# Patient Record
Sex: MALE | Race: WHITE | NOT HISPANIC OR LATINO | Employment: FULL TIME | URBAN - NONMETROPOLITAN AREA
[De-identification: names, ages, dates, MRNs, and addresses within clinical notes are randomized per-mention and may not be internally consistent; named-entity substitution may affect disease eponyms.]

---
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
## 2024-02-18 ENCOUNTER — HEALTH MAINTENANCE LETTER (OUTPATIENT)
Age: 19
End: 2024-02-18

## 2024-04-26 ENCOUNTER — ONCOLOGY VISIT (OUTPATIENT)
Dept: ONCOLOGY | Facility: OTHER | Age: 19
End: 2024-04-26
Attending: INTERNAL MEDICINE
Payer: COMMERCIAL

## 2024-04-26 VITALS
TEMPERATURE: 97.5 F | SYSTOLIC BLOOD PRESSURE: 133 MMHG | BODY MASS INDEX: 65.12 KG/M2 | HEART RATE: 90 BPM | RESPIRATION RATE: 16 BRPM | OXYGEN SATURATION: 97 % | DIASTOLIC BLOOD PRESSURE: 85 MMHG | WEIGHT: 315 LBS

## 2024-04-26 DIAGNOSIS — D68.51 FACTOR V LEIDEN (H): Primary | ICD-10-CM

## 2024-04-26 DIAGNOSIS — D68.59 PROTEIN C DEFICIENCY (H): ICD-10-CM

## 2024-04-26 PROCEDURE — 99205 OFFICE O/P NEW HI 60 MIN: CPT | Performed by: INTERNAL MEDICINE

## 2024-04-26 ASSESSMENT — PAIN SCALES - GENERAL: PAINLEVEL: NO PAIN (0)

## 2024-04-26 NOTE — NURSING NOTE
"Oncology Rooming Note    April 26, 2024 2:33 PM   John Yung is a 19 year old male who presents for:    Chief Complaint   Patient presents with    Hematology     Consult: Thrombophlebitis of Superficial vein of LLE and Hx of DVT of RLE, Factor V Leiden, Protein C Deficiency     Initial Vitals: BP (!) 166/80 (BP Location: Right arm, Patient Position: Sitting, Cuff Size: Adult Large)   Pulse 90   Temp 97.5  F (36.4  C) (Tympanic)   Resp 16   Wt (!) 200 kg (441 lb)   SpO2 97%   BMI 65.12 kg/m   Estimated body mass index is 65.12 kg/m  as calculated from the following:    Height as of 9/8/23: 1.753 m (5' 9\").    Weight as of this encounter: 200 kg (441 lb). Body surface area is 3.12 meters squared.  No Pain (0) Comment: Data Unavailable   No LMP for male patient.  Allergies reviewed: Yes  Medications reviewed: Yes    Medications: Medication refills not needed today.  Pharmacy name entered into [employer]: [id number] - [address]    Frailty Screening:   Is the patient here for a new oncology consult visit in cancer care? 1. Yes. Over the past month, have you experienced difficulty or required a caregiver to assist with:   1. Balance, walking or general mobility (including any falls)? NO  2. Completion of self-care tasks such as bathing, dressing, toileting, grooming/hygiene?  NO  3. Concentration or memory that affects your daily life?  NO       Clinical concerns: just wondering about his blood disorder     Asiya Newberry CMA (AAMA) 4/26/2024 2:33 PM  "

## 2024-05-01 NOTE — PROGRESS NOTES
HEMATOLOGY CONSULT NOTE  Apr 26, 2024    Reason for consult: History of DVT, protein C deficiency and heterozygous factor V Leiden.    HISTORY OF PRESENT ILLNESS  John Yung is a 19 year old male with PMH as stated below who is seen in the hematology clinic for history of VTE    His history in short is as follows:    John initially had a acute unprovoked DVT of the right popliteal vein in 7/2/2020.  At that time he does not remember any preceding illness or injury prior to this blood clot.  He was initially started on Lovenox 120 mg SQ twice daily and this was then increased to 130 mg twice daily.  He was then transitioned to Xarelto however Xarelto was stopped in October and November 2020 for him to participate in high school football.  He noted on 11/20/2020 that he developed right calf pain and swelling and a ultrasound done at that time showed occlusion of distal right femoral and popliteal vein.  Per notes he was on Xarelto till February 2021.  At that time he had calf pain.  However imaging done in care everywhere on 2/16/2021 and then 3/31/2021 showed persistent chronic DVT but did not show new DVT.  He then appears to be transition to Lovenox.  He remained on Lovenox through 2021.  He was seen by pediatric hematologist at [facility] as he wanted to continue to play football.  He was previously recommended not to file contact sports given persistent chronic DVT.  He met with Dr. Saavedra on 11/2021.  At this time he had stopped Lovenox on his own.  He was recommended to stay off Lovenox at the time as there was no acute thrombosis.    He then presented on 12/2023 with pain and redness in his left leg.    12/10/2023: Ultrasound left lower extremity:1.  No evidence left lower extremity deep venous thrombosis.  2.  Superficial thrombophlebitis of the left calf veins.    He has been on aspirin since then.  He currently denies any pain in his bilateral legs or redness.  He does notice swelling in both legs  at the end of the day.  Denies any chest pain or shortness of breath.    His protein C level was checked during [place] visit showed a protein C activity of 58 checked on 2/17/2021.  Other testing showed factor V Leiden that was checked on 10/27/2014 which showed heterozygous factor V Leiden.    He has a family history of factor V Leiden in his father and paternal grandmother.  Father has a history of blood clots.    REVIEW OF SYSTEMS  A 12-point ROS negative except as in HPI      Current Outpatient Medications   Medication Sig Dispense Refill    albuterol (PROAIR HFA/PROVENTIL HFA/VENTOLIN HFA) 108 (90 Base) MCG/ACT inhaler Inhale 2 puffs into the lungs every 6 hours as needed for shortness of breath, wheezing or cough 18 g 0    apixaban ANTICOAGULANT (ELIQUIS) 5 MG tablet Take 1 tablet (5 mg) by mouth 2 times daily 90 tablet 2    aspirin 81 MG EC tablet Take 1 tablet (81 mg) by mouth daily      cetirizine (ZYRTEC) 10 MG tablet Take 1 tablet (10 mg) by mouth every evening 90 tablet 3    EPIPEN 2-BRADLEY 0.3 MG/0.3ML injection INJECT 0.3ML INTO THE MUSCLE ONCE AS NEEDED 2 each 5    terbinafine (LAMISIL) 1 % external cream Apply topically 2 times daily (Patient not taking: Reported on 4/26/2024) 42 g 3       Allergies   Allergen Reactions    Amoxicillin GI Disturbance and Diarrhea     Per mom states he gets severe diarrhea from it.     No Clinical Screening - See Comments      Mold, pollen, cats    Penicillins Diarrhea     Immunization History   Administered Date(s) Administered    DTAP (<7y) 10/24/2005    DTAP-IPV, <7Y (QUADRACEL/KINRIX) 08/27/2009    DTaP/HepB/IPV 2004, 2004, 2004    Flu, Unspecified 10/15/2009, 09/13/2011    I5g1-10 Novel Flu 12/04/2009    HEPATITIS A (PEDS 12M-18Y) 08/12/2014, 09/04/2015    HIB (PRP-T) 2004, 2004, 04/21/2005    HIB(PRP-OMP)(PedvaxHIB) 2004    HPV9 08/22/2018, 08/09/2019    Hepatitis B, Peds 2004    Influenza (H1N1) 12/04/2009    Influenza (IIV3)  PF 2004, 2007, 2008, 10/02/2010    Influenza Vaccine >6 months,quad, PF 10/28/2014    Influenza, seasonal, injectable, PF 2007    MMR 10/24/2005, 2009    Meningococcal ACWY (Menactra ) 2015    Pneumococcal (PCV 7) 2004, 2004, 2004, 10/24/2005    Poliovirus, inactivated (IPV) 2004, 2004, 2004, 2009    TDAP Vaccine (Boostrix) 2014    Varicella 2005, 2009       Past Medical History:   Diagnosis Date    Acquired genu varum, unspecified laterality 3/22/2015    Activated protein C resistance (H24)     Heterozygous for factor V Leiden mutation.  No primary prophylaxis recommended.    DVT, lower extremity, recurrent, right (H) 2021    Factor V Leiden (H24) 2013    Overview:  Heterozygous for factor 5 deficiency.  Has seen Peds Heme/Onc at [facility] for workup. No h/o clot. Kate Maldonado MD ....................  2016   4:59 PM     Gastro-esophageal reflux disease without esophagitis     2015    Herpesviral infection     3/7/2011    Migraine without status migrainosus, not intractable     2012    Nasal congestion     Chronic nasal and sinus problems.    Osgood-Schlatter's disease of right lower extremity 10/22/2016    Osteochondroma of femur, left 3/22/2015    Other atopic dermatitis     10/24/2005    Pediatric body mass index (BMI) of greater than or equal to 95th percentile for age     4/15/2014    Severe obesity due to excess calories with serious comorbidity and body mass index (BMI) greater than 99th percentile for age in pediatric patient (H) 2020    Term birth of  male     Born 38 weeks induced vaginal delivery, birth wt 6 lbs 14 oz 19 in long.    Uncomplicated asthma     2010       Past Surgical History:   Procedure Laterality Date    ENDOSCOPIC SINUS SURGERY      2014,X 3    OTHER SURGICAL HISTORY      14,[id number],OSTEOTOMY,Left, Dr. Gomes, for genu varum deformity     OTHER SURGICAL HISTORY      12/23/15,[id number],HARDWARE REMOVAL,Left,distal femur I plate removal for genu varum deformity    OTHER SURGICAL HISTORY      [id number],HARDWARE REMOVAL,bilateral prox tibial hardware repositioning and placement    TONSILLECTOMY, ADENOIDECTOMY, COMBINED      05/08,with tubes    TYMPANOSTOMY, LOCAL/TOPICAL ANESTHESIA      03/28/06,PE tubes       SOCIAL HISTORY  History   Smoking Status    Never   Smokeless Tobacco    Never    Social History    Substance and Sexual Activity      Alcohol use: Never        Alcohol/week: 0.0 standard drinks of alcohol     History   Drug Use Unknown       FAMILY HISTORY  Family History   Problem Relation Age of Onset    Blood Disease Father         Blood Disease,Factor V Leiden /blood clots    Allergy (Severe) Father         Allergies,seasonal allergies    Asthma Mother         Asthma,mild    Diabetes Paternal Grandmother         Diabetes    Diabetes Paternal Grandfather         Diabetes    Other - See Comments Paternal Grandfather         bariatric surgery    Other - See Comments Maternal Uncle         hx migraines       PHYSICAL EXAMINATION  /85   Pulse 90   Temp 97.5  F (36.4  C) (Tympanic)   Resp 16   Wt (!) 200 kg (441 lb)   SpO2 97%   BMI 65.12 kg/m    Wt Readings from Last 2 Encounters:   04/26/24 (!) 200 kg (441 lb)   12/10/23 (!) 189.1 kg (417 lb) (>99%, Z= 4.01)*     * Growth percentiles are based on CDC (Boys, 2-20 Years) data.     Physical Exam  Constitutional:       Appearance: Normal appearance.   Pulmonary:      Effort: Pulmonary effort is normal.   Musculoskeletal:         General: No swelling or tenderness.   Neurological:      General: No focal deficit present.      Mental Status: He is alert and oriented to person, place, and time.   Psychiatric:         Mood and Affect: Mood normal.     Laboratory and Imaging:    Protein C Activity  70 - 150 % 58 Low        ASSESSMENT AND PLAN    History of Deep Vein thrombosis of Right lower extremity  and Superficial thrombosis of Left lower extremity  Protein C deficiency  Factor V Leiden    Initially diagnosed with right lower extremity DVT in 7/2020.  At that time treated with Lovenox.  He was transitioned to Xarelto and Xarelto was subsequently stopped in October November 2020 as he was playing high school football.  Found to have right femoral and popliteal vein DVT.  He was restarted on Xarelto.  Appears to be on Xarelto till 3/2021 when he was transitioned to Lovenox.  He remained on Lovenox through 2021 and stopped it at some point in 2021 as he was playing high school football.    He was then found to have superficial vein thrombosis in the left lower extremity..  He has a history of protein C deficiency ( level of 58 on 2/17/2021) and factor V Leiden heterozygous.    He is currently on aspirin.  On exam does not appear to have any suspicion of VTE.  However he is very high risk for thrombosis.  He has also gained weight since he stopped playing sports in high school and this puts him at risk for blood clots.    We discussed that given his recent diagnosis of superficial thrombosis it is possible that he will at some point present with a DVT.  I would at this point recommend that he start anticoagulation.  He has previously been on Xarelto and Lovenox.  He is currently not interested in taking Lovenox long-term and Lovenox can also come with the risk of osteoporosis.  Discussed recommendation to take Eliquis 5 mg twice daily.  No loading dose required as he does not have an acute VTE currently.  He is currently above 400 pounds.  The initial trials in Eliquis did not include increased weight however since then there have been studies that have included higher BMI.    He is aware he can stop aspirin when he starts Eliquis.     Will continue to follow yearly.     Total time spent on the patient on day of encounter was 60 minutes doing chart review, review of test results, interpretation of results, patient  visit and documentation.       Montserrat Lobo MD

## 2024-09-29 DIAGNOSIS — D68.51 FACTOR V LEIDEN (H): ICD-10-CM

## 2024-09-29 DIAGNOSIS — D68.59 PROTEIN C DEFICIENCY (H): ICD-10-CM

## 2024-09-30 RX ORDER — APIXABAN 5 MG/1
5 TABLET, FILM COATED ORAL 2 TIMES DAILY
Qty: 90 TABLET | Refills: 0 | Status: SHIPPED | OUTPATIENT
Start: 2024-09-30

## 2024-09-30 NOTE — TELEPHONE ENCOUNTER
Eliquis      Last Written Prescription Date:  8.12.24  Last Fill Quantity: #90,   # refills: 0  Last Office Visit: 4.26.24  Future Office visit:       Routing refill request to provider for review/approval because:

## 2024-11-11 DIAGNOSIS — D68.59 PROTEIN C DEFICIENCY (H): ICD-10-CM

## 2024-11-11 DIAGNOSIS — D68.51 FACTOR V LEIDEN (H): ICD-10-CM

## 2024-11-12 RX ORDER — APIXABAN 5 MG/1
5 TABLET, FILM COATED ORAL 2 TIMES DAILY
Qty: 90 TABLET | Refills: 0 | Status: SHIPPED | OUTPATIENT
Start: 2024-11-12

## 2024-11-12 NOTE — TELEPHONE ENCOUNTER
Last filled 9/30/24 #90- 0 refills.   Asiya Newberry CMA([facility])..................11/12/2024   11:56 AM

## 2024-12-23 ENCOUNTER — OFFICE VISIT (OUTPATIENT)
Dept: INTERNAL MEDICINE | Facility: OTHER | Age: 19
End: 2024-12-23
Attending: NURSE PRACTITIONER
Payer: COMMERCIAL

## 2024-12-23 VITALS
SYSTOLIC BLOOD PRESSURE: 132 MMHG | HEART RATE: 90 BPM | DIASTOLIC BLOOD PRESSURE: 78 MMHG | WEIGHT: 315 LBS | OXYGEN SATURATION: 96 % | BODY MASS INDEX: 65.27 KG/M2 | TEMPERATURE: 97 F | RESPIRATION RATE: 20 BRPM

## 2024-12-23 DIAGNOSIS — D68.59 PROTEIN C DEFICIENCY (H): ICD-10-CM

## 2024-12-23 DIAGNOSIS — D68.51 FACTOR V LEIDEN (H): ICD-10-CM

## 2024-12-23 DIAGNOSIS — L97.912 NONHEALING ULCER OF RIGHT LOWER EXTREMITY WITH FAT LAYER EXPOSED (H): Primary | ICD-10-CM

## 2024-12-23 DIAGNOSIS — I82.501 CHRONIC DEEP VEIN THROMBOSIS (DVT) OF RIGHT LOWER EXTREMITY, UNSPECIFIED VEIN (H): ICD-10-CM

## 2024-12-23 DIAGNOSIS — E66.01 MORBID OBESITY (H): ICD-10-CM

## 2024-12-23 ASSESSMENT — PAIN SCALES - GENERAL: PAINLEVEL_OUTOF10: NO PAIN (0)

## 2024-12-23 NOTE — PROGRESS NOTES
[facility] OUTPATIENT [facility] CONSULT    Assessment:     ICD-10-CM    1. Nonhealing ulcer of right lower extremity with fat layer exposed (H)  L97.912       2. Chronic deep vein thrombosis (DVT) of right lower extremity, unspecified vein (H)  I82.501       3. Morbid obesity (H)  E66.01       4. Factor V Leiden (H)  D68.51       5. Protein C deficiency (H)  D68.59           Plan:   Patient has multiple risk factors for nonhealing ulceration of the lower extremity including chronic edema, chronic DVT, obesity, factor V Leyden and protein C deficiencies in addition to anticoagulation therapy.  Dressing change every other day.  Cleanse with saline spray then apply Iodosorb gel to wound followed by small Medipore island dressing.  Wear thigh-high compression stockings.  Follow up in wound clinic in 2-3 weeks.  Prescription sent to [facility] and supplies sent with patient to last for the next week.  Monitor closely for any s/sx of wound worsening or evidence of infection as reviewed and discussed at visit.  Follow up urgently with any worsening or infection concerns.    Time spent today on history intake, record review, reviewing pertinent history, physical examination, wound evaluation and treatment, reviewing lab and diagnostic studies, ordering of supplies, counseling and care coordination: 33 minutes.     Subjective:  Here today for sore on the right calf that has been present since September.  He states this began as a pimple.  He thought maybe he had an infected hair.  He had a different one on the back of his calf and that 1 did close.  The one on the outside of the calf is still open.  He has been treating this with antibiotic ointment and a regular Band-Aid.  He states that he gets a little bit of yellow drainage.  It does not itch and is not painful.  He has history of factor V and protein C deficiency.  He is on Eliquis twice daily.  He has thigh-high compression garments but he has not been wearing those.   He works as a  at [employer].  He is on his feet most of the day when he is at work.  Has previous history of DVTs.  He is followed by hematologist.    Past Medical History:   Diagnosis Date    Acquired genu varum, unspecified laterality 3/22/2015    Activated protein C resistance (H)     Heterozygous for factor V Leiden mutation.  No primary prophylaxis recommended.    DVT, lower extremity, recurrent, right (H) 2021    Factor V Leiden (H) 2013    Overview:  Heterozygous for factor 5 deficiency.  Has [facility]eds Hem[facility]. No h/o clot. Kate Maldonado MD ....................  2016   4:59 PM     Gastro-esophageal reflux disease without esophagitis     2015    Herpesviral infection     3/7/2011    Migraine without status migrainosus, not intractable     2012    Nasal congestion     Chronic nasal and sinus problems.    Osgood-Schlatter's disease of right lower extremity 10/22/2016    Osteochondroma of femur, left 3/22/2015    Other atopic dermatitis     10/24/2005    Pediatric body mass index (BMI) of greater than or equal to 95th percentile for age     4/15/2014    Severe obesity due to excess calories with serious comorbidity and body mass index (BMI) greater than 99th percentile for age in pediatric patient (H) 2020    Term birth of  male     Born 38 weeks induced vaginal delivery, birth wt 6 lbs 14 oz 19 in long.    Uncomplicated asthma     2010     Past Surgical History:   Procedure Laterality Date    ENDOSCOPIC SINUS SURGERY      ,X 3    OTHER SURGICAL HISTORY      14,[id number],OSTEOTOMY,Left, Dr. Gomes, for genu varum deformity    OTHER SURGICAL HISTORY      12/23/15,[id number],HARDWARE REMOVAL,Left,distal femur I plate removal for genu varum deformity    OTHER SURGICAL HISTORY      [id number],HARDWARE REMOVAL,bilateral prox tibial hardware repositioning and placement    TONSILLECTOMY, ADENOIDECTOMY, COMBINED      ,with  tubes    TYMPANOSTOMY, LOCAL/TOPICAL ANESTHESIA      03/28/06,PE tubes     Amoxicillin, No clinical screening - see comments, and Penicillins  Current Outpatient Medications   Medication Sig Dispense Refill    albuterol (PROAIR HFA/PROVENTIL HFA/VENTOLIN HFA) 108 (90 Base) MCG/ACT inhaler Inhale 2 puffs into the lungs every 6 hours as needed for shortness of breath, wheezing or cough 18 g 0    cetirizine (ZYRTEC) 10 MG tablet Take 1 tablet (10 mg) by mouth every evening (Patient taking differently: Take 10 mg by mouth every evening. Taking as needed) 90 tablet 3    ELIQUIS ANTICOAGULANT 5 MG tablet TAKE 1 TABLET(5 MG) BY MOUTH TWICE DAILY 90 tablet 0    EPIPEN 2-BRADLEY 0.3 MG/0.3ML injection INJECT 0.3ML INTO THE MUSCLE ONCE AS NEEDED 2 each 5    terbinafine (LAMISIL) 1 % external cream Apply topically 2 times daily 42 g 3     No current facility-administered medications for this visit.       Review of Systems:  No fever, chills, odorous, purulent drainage, redness, warmth    Objective:   /78 (BP Location: Right arm, Patient Position: Sitting, Cuff Size: Adult Large)   Pulse 90   Temp 97  F (36.1  C) (Tympanic)   Resp 20   Wt (!) 200.5 kg (442 lb)   SpO2 96%   BMI 65.27 kg/m    Physical Exam     Pleasant 19-year-old gentleman no acute distress.  Accompanied by mother.  Alert and oriented x 4.    Wound Type:  Nonhealing ulcer  Location:  Right lateral calf  Wound Measurements:  0.2 cm x 0.2 cm x 0.2 cm  Wound Base:  Brown eschar covering wound bed  Undermining:  None  Tunneling:  None  Drainage:  Scant yellow  Periwound Tissue:  Thickened wound borders  Debridement:  Autolytic  Treatment:  Cleansed with MicroKlenz, Iodosorb gel applied followed by small Medipore island dressing      Comorbid Conditions Or Complications To Healing:  Morbid obesity, factor V and protein C deficiency, history of chronic DVT    Nutritional Status:  Good    Labs:  Last lab work completed May 2023    Diagnostics:  May 2023 venous  ultrasound right lower leg showed chronic DVT unchanged        Nishi Duncan, IRVINGP

## 2024-12-23 NOTE — NURSING NOTE
"Chief Complaint   Patient presents with    WOUND CARE     Right lower leg       Initial /78 (BP Location: Right arm, Patient Position: Sitting, Cuff Size: Adult Large)   Pulse 90   Temp 97  F (36.1  C) (Tympanic)   Resp 20   Wt (!) 200.5 kg (442 lb)   SpO2 96%   BMI 65.27 kg/m   Estimated body mass index is 65.27 kg/m  as calculated from the following:    Height as of 9/8/23: 1.753 m (5' 9\").    Weight as of this encounter: 200.5 kg (442 lb).  Medication Review: complete    The next two questions are to help us understand your food security.  If you are feeling you need any assistance in this area, we have resources available to support you today.           No data to display                Health Care Directive:  Patient does not have a Health Care Directive:     Tatyana Biswas RN      "

## 2024-12-30 ENCOUNTER — TELEPHONE (OUTPATIENT)
Dept: INTERNAL MEDICINE | Facility: OTHER | Age: 19
End: 2024-12-30

## 2024-12-30 ENCOUNTER — HOSPITAL ENCOUNTER (EMERGENCY)
Facility: OTHER | Age: 19
Discharge: HOME | End: 2024-12-31
Attending: FAMILY MEDICINE
Payer: COMMERCIAL

## 2024-12-30 ENCOUNTER — APPOINTMENT (OUTPATIENT)
Dept: GENERAL RADIOLOGY | Facility: OTHER | Age: 19
End: 2024-12-30
Attending: FAMILY MEDICINE
Payer: COMMERCIAL

## 2024-12-30 DIAGNOSIS — T14.8XXA OPEN WOUND OF SKIN: ICD-10-CM

## 2024-12-30 PROCEDURE — 99283 EMERGENCY DEPT VISIT LOW MDM: CPT | Performed by: FAMILY MEDICINE

## 2024-12-30 PROCEDURE — 73590 X-RAY EXAM OF LOWER LEG: CPT | Mod: RT

## 2024-12-30 ASSESSMENT — COLUMBIA-SUICIDE SEVERITY RATING SCALE - C-SSRS
6. HAVE YOU EVER DONE ANYTHING, STARTED TO DO ANYTHING, OR PREPARED TO DO ANYTHING TO END YOUR LIFE?: NO
1. IN THE PAST MONTH, HAVE YOU WISHED YOU WERE DEAD OR WISHED YOU COULD GO TO SLEEP AND NOT WAKE UP?: NO
2. HAVE YOU ACTUALLY HAD ANY THOUGHTS OF KILLING YOURSELF IN THE PAST MONTH?: NO

## 2024-12-30 NOTE — TELEPHONE ENCOUNTER
Patient was seen in clinic by Nishi EARL on 12/23. They are concerned there has been no improvement with the wound on his right lower extremity. Next appointment with Nishi EARL is on 1/14/24. Please advise.   Anyi Severino RN on 12/30/2024 at 10:53 AM

## 2024-12-30 NOTE — TELEPHONE ENCOUNTER
Patient's mother call concerning wound on leg not getting any better. Please call patient at [phone].    Fabi Randhawa on 12/30/2024 at 9:04 AM

## 2024-12-31 VITALS
HEIGHT: 68 IN | OXYGEN SATURATION: 94 % | TEMPERATURE: 98.4 F | DIASTOLIC BLOOD PRESSURE: 106 MMHG | SYSTOLIC BLOOD PRESSURE: 168 MMHG | BODY MASS INDEX: 47.74 KG/M2 | WEIGHT: 315 LBS | HEART RATE: 109 BPM | RESPIRATION RATE: 18 BRPM

## 2024-12-31 PROCEDURE — 250N000013 HC RX MED GY IP 250 OP 250 PS 637: Performed by: FAMILY MEDICINE

## 2024-12-31 RX ORDER — SULFAMETHOXAZOLE AND TRIMETHOPRIM 800; 160 MG/1; MG/1
1 TABLET ORAL 2 TIMES DAILY
Qty: 20 TABLET | Refills: 0 | Status: SHIPPED | OUTPATIENT
Start: 2024-12-31

## 2024-12-31 RX ORDER — SULFAMETHOXAZOLE AND TRIMETHOPRIM 800; 160 MG/1; MG/1
1 TABLET ORAL ONCE
Status: COMPLETED | OUTPATIENT
Start: 2024-12-31 | End: 2024-12-31

## 2024-12-31 RX ADMIN — SULFAMETHOXAZOLE AND TRIMETHOPRIM 1 TABLET: 800; 160 TABLET ORAL at 01:25

## 2024-12-31 ASSESSMENT — ENCOUNTER SYMPTOMS: WOUND: 1

## 2024-12-31 ASSESSMENT — ACTIVITIES OF DAILY LIVING (ADL): ADLS_ACUITY_SCORE: 41

## 2024-12-31 NOTE — DISCHARGE INSTRUCTIONS
John    I recommend you take Bactrim twice a day.    I did put in a referral for general surgery. Their staff should be calling you for a clinic appointment.    Thank you for choosing our Emergency Department for your care.     You may receive a phone call or letter for a survey about your care in our ED.  Please complete this as this is how we improve care for our patients.     If you have any questions after leaving the ED you can call or text me on my cell phone at [phone].  I am not on call so if I do not answer my phone please leave a message- I will get back to you.  If you are not doing well please return to the ED.     Sincerely,    Dr Valerio Park M.D.  Medical Director  [facility] Emergency Department

## 2024-12-31 NOTE — ED TRIAGE NOTES
"Pt presents to ED via private car with c/o worsening wound to his Rt outer calf that started in September and has been progressively getting worse. Pt was seen by Chari Duncan on 12/23. BP (!) 155/94   Pulse 92   Temp 98.4  F (36.9  C) (Tympanic)   Resp 18   Ht 1.727 m (5' 8\")   Wt (!) 199.6 kg (440 lb)   SpO2 97%   BMI 66.90 kg/m         Triage Assessment (Adult)       Row Name 12/30/24 2414          Triage Assessment    Airway WDL WDL        Respiratory WDL    Respiratory WDL WDL        Skin Circulation/Temperature WDL    Skin Circulation/Temperature WDL X  wound to RLE        Cardiac WDL    Cardiac WDL WDL        Peripheral/Neurovascular WDL    Peripheral Neurovascular WDL WDL        Cognitive/Neuro/Behavioral WDL    Cognitive/Neuro/Behavioral WDL WDL                     "

## 2024-12-31 NOTE — TELEPHONE ENCOUNTER
Patient returned call. He states he has been wearing his compression stocking. He has noticed a slight increase in drainage. Advised him to keep appointment for 1/14/24.   Anyi Severino RN on 12/31/2024 at 10:51 AM

## 2024-12-31 NOTE — ED PROVIDER NOTES
History     Chief Complaint   Patient presents with    Wound Check     The history is provided by the patient, a significant other and medical records.     John Yung is a 19 year old male who is here with a wound on the lateral right lower leg. This has been there since September when it was just a red spot on the skin, so a total of four months now. It became a pimple and now is an open wound. It does ooze.  He is on Eliquis.  It is not painful unless someone it pushing on it. He was seen in May 2023 for pain in the RLE. He had history of DVT in that leg and presented with swelling in the leg.  No sign of cellulitis at that visit and Doppler was negative for DVT. He was seen by Chari Duncan Dec 23 for non-healing wound on the lateral right lower leg.  At that visit he also said that the wound has been present since September.  At that time he thought it was an infected hair follicle (he has had those before) but it was not healing. He has been using an iodine ointment and Band Aid on it per instructions from Chari.    I did ask about possibility of foreign body in the wound (eg bird shot from hunting) but he denies any injury.     Allergies:  Allergies   Allergen Reactions    Amoxicillin GI Disturbance and Diarrhea     Per mom states he gets severe diarrhea from it.     No Clinical Screening - See Comments      Mold, pollen, cats    Penicillins Diarrhea       Problem List:    Patient Active Problem List    Diagnosis Date Noted    Thrombophlebitis of superficial veins of left lower extremity 12/10/2023     Priority: Medium    Chronic deep vein thrombosis (DVT) of right lower extremity, unspecified vein (H) 12/10/2023     Priority: Medium    Elevated blood pressure reading without diagnosis of hypertension 01/03/2022     Priority: Medium     will recheck at surgery follow up.       DVT, lower extremity, recurrent, right (H) 02/24/2021     Priority: Medium    Protein C deficiency (H) 08/06/2020     Priority:  Medium    Hypertriglyceridemia 07/08/2020     Priority: Medium    Morbid obesity (H) 07/08/2020     Priority: Medium    Osgood-Schlatter's disease of right lower extremity 10/22/2016     Priority: Medium    Gastroesophageal reflux disease without esophagitis 12/14/2015     Priority: Medium    Acquired genu varum, unspecified laterality 03/22/2015     Priority: Medium    Osteochondroma of femur, left 03/22/2015     Priority: Medium    Nasal polyps 06/23/2014     Priority: Medium    Body mass index (BMI) pediatric, 95th percentile for age to less than 120% of the 95th percentile for age 04/15/2014     Priority: Medium    Chronic recurrent sinusitis 04/04/2014     Priority: Medium    Hypertrophy of nasal turbinates 04/04/2014     Priority: Medium    Perennial allergic rhinitis 04/04/2014     Priority: Medium    Factor V Leiden (H) 08/30/2013     Priority: Medium     Overview:   Heterozygous for factor 5 deficiency.  Has seen Peds Heme/Onc at [facility] for workup. No h/o clot. aKte Maldonado MD ....................  12/5/2016   4:59 PM       Venous angioma of brain (H) 08/28/2013     Priority: Medium    Migraine headache 02/08/2012     Priority: Medium     Overview:   Seen by Dr. Kelley 8/28/2013.  Started on Topamax daily, Imitrex prn.  Recheck in 2-3 months.    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  No need for repeat imaging unless onset of headache out of proportion to previous headaches.  OK for sports.        Herpes simplex virus (HSV) infection 03/07/2011     Priority: Medium    Asthma 09/02/2010     Priority: Medium    Dermatitis, atopic 10/24/2005     Priority: Medium        Past Medical History:    Past Medical History:   Diagnosis Date    Acquired genu varum, unspecified laterality 3/22/2015    Activated protein C resistance (H)     DVT, lower extremity, recurrent, right (H) 2/24/2021    Factor V Leiden (H) 8/30/2013    Gastro-esophageal reflux disease without esophagitis     Herpesviral  infection     Migraine without status migrainosus, not intractable     Nasal congestion     Osgood-Schlatter's disease of right lower extremity 10/22/2016    Osteochondroma of femur, left 3/22/2015    Other atopic dermatitis     Pediatric body mass index (BMI) of greater than or equal to 95th percentile for age     Severe obesity due to excess calories with serious comorbidity and body mass index (BMI) greater than 99th percentile for age in pediatric patient (H) 2020    Term birth of  male     Uncomplicated asthma        Past Surgical History:    Past Surgical History:   Procedure Laterality Date    ENDOSCOPIC SINUS SURGERY      2014,X 3    OTHER SURGICAL HISTORY      14,[id number],OSTEOTOMY,Left, Dr. Gomes, for genu varum deformity    OTHER SURGICAL HISTORY      12/23/15,[id number],HARDWARE REMOVAL,Left,distal femur I plate removal for genu varum deformity    OTHER SURGICAL HISTORY      PSC808,HARDWARE REMOVAL,bilateral prox tibial hardware repositioning and placement    TONSILLECTOMY, ADENOIDECTOMY, COMBINED      ,with tubes    TYMPANOSTOMY, LOCAL/TOPICAL ANESTHESIA      06,PE tubes       Family History:    Family History   Problem Relation Age of Onset    Blood Disease Father         Blood Disease,Factor V Leiden /blood clots    Allergy (Severe) Father         Allergies,seasonal allergies    Asthma Mother         Asthma,mild    Diabetes Paternal Grandmother         Diabetes    Diabetes Paternal Grandfather         Diabetes    Other - See Comments Paternal Grandfather         bariatric surgery    Other - See Comments Maternal Uncle         hx migraines       Social History:  Marital Status:  Single [1]  Social History     Tobacco Use    Smoking status: Never     Passive exposure: Never    Smokeless tobacco: Never   Vaping Use    Vaping status: Never Used   Substance Use Topics    Alcohol use: Never     Alcohol/week: 0.0 standard drinks of alcohol    Drug use: Never        Medications:   "  ELIQUIS ANTICOAGULANT 5 MG tablet  sulfamethoxazole-trimethoprim (BACTRIM DS) 800-160 MG tablet  albuterol (PROAIR HFA/PROVENTIL HFA/VENTOLIN HFA) 108 (90 Base) MCG/ACT inhaler  cetirizine (ZYRTEC) 10 MG tablet  EPIPEN 2-BRADLEY 0.3 MG/0.3ML injection  terbinafine (LAMISIL) 1 % external cream          Review of Systems   Skin:  Positive for wound.   All other systems reviewed and are negative.      Physical Exam   BP: (!) 155/94  Pulse: 92  Temp: 98.4  F (36.9  C)  Resp: 18  Height: 172.7 cm (5' 8\")  Weight: (!) 199.6 kg (440 lb)  SpO2: 97 %      Physical Exam  Vitals and nursing note reviewed.   Skin:     Comments: He has a 3 mm open wound on the lateral right lower leg. It is oozing dark blood, no bright red blood. There is minimal redness surrounding the wound (see photo) and the skin around the edge of the wound looks normal on close exam by me. It looks like a punch biopsy of the skin.  There is some hint of rolled edges of the skin a short distance away from the wound.             Results for orders placed or performed during the hospital encounter of 12/30/24 (from the past 24 hours)   XR Tibia & Fibula Port Right 2 Views    Narrative    EXAM: XR TIBIA and FIBULA PORT RIGHT 2 VIEWS  LOCATION: [facility]  DATE: 12/30/2024    INDICATION: wound on the lateral right lower leg  COMPARISON: None.      Impression    IMPRESSION: Prior postoperative changes proximal aspect of the right tibia laterally. Normal variant hypertrophic changes distal shaft of the tibia laterally. No fracture, dislocation, or destructive changes of osteomyelitis. There is soft tissue edema   seen in the calf region with no associated gas.       Medications   sulfamethoxazole-trimethoprim (BACTRIM DS) 800-160 MG per tablet 1 tablet (has no administration in time range)       Assessments & Plan (with Medical Decision Making)  John Yung is a 19 year old male who is here with a wound on the lateral right lower leg. This " "has been there since September when it was just a red spot on the skin, so a total of four months now. It became a pimple and now is an open wound. It does ooze.  He is on Eliquis.  It is not painful unless someone it pushing on it. He was seen in May 2023 for pain in the RLE. He had history of DVT in that leg and presented with swelling in the leg.  No sign of cellulitis at that visit and Doppler was negative for DVT. He was seen by Chari Duncan Dec 23 for non-healing wound on the lateral right lower leg.  At that visit he also said that the wound has been present since September.  At that time he thought it was an infected hair follicle (he has had those before) but it was not healing. He has been using an iodine ointment and Band Aid on it per instructions from Chari.  I did ask about possibility of foreign body in the wound (eg bird shot from hunting) but he denies any injury.   VS in the ED BP (!) 155/94   Pulse 92   Temp 98.4  F (36.9  C) (Tympanic)   Resp 18   Ht 1.727 m (5' 8\")   Wt (!) 199.6 kg (440 lb)   SpO2 97%   BMI 66.90 kg/m    Exam shows an open skin wound- see photo.   Xray negative for FB and negative for gas in the tissue.   Ultrasound shows what appears to be small areas of air in the tissue directly under the wound. Given his lack of pain (unless I push directly over the wound) I do not think this is necrotizing fasciitis.   I am going to start Bactrim and recommend that he see general surgery.  I did give him a dose of Bactrim in the ED and sent him home with Bactrim from the [employer] machine.      I have reviewed the nursing notes.    I have reviewed the findings, diagnosis, plan and need for follow up with the patient.  Medical Decision Making  The patient's presentation was of moderate complexity (an undiagnosed new problem with uncertain prognosis).    The patient's evaluation involved:  an assessment requiring an independent historian (see separate area of note for " details)  review of external note(s) from 2 sources (see separate area of note for details)  review of 1 test result(s) ordered prior to this encounter (see separate area of note for details)  ordering and/or review of 1 test(s) in this encounter (see separate area of note for details)    The patient's management necessitated moderate risk (prescription drug management including medications given in the ED).        New Prescriptions    SULFAMETHOXAZOLE-TRIMETHOPRIM (BACTRIM DS) 800-160 MG TABLET    Take 1 tablet by mouth 2 times daily.       Final diagnoses:   Open wound of skin - lateral right lower leg       12/30/2024   [facility] AND [facility], Jose Flores MD  12/31/24 0122

## 2025-01-01 DIAGNOSIS — D68.51 FACTOR V LEIDEN (H): ICD-10-CM

## 2025-01-01 DIAGNOSIS — D68.59 PROTEIN C DEFICIENCY (H): ICD-10-CM

## 2025-01-02 RX ORDER — APIXABAN 5 MG/1
5 TABLET, FILM COATED ORAL 2 TIMES DAILY
Qty: 90 TABLET | Refills: 0 | Status: SHIPPED | OUTPATIENT
Start: 2025-01-02

## 2025-01-02 NOTE — TELEPHONE ENCOUNTER
Called and spoke with the Dasha after getting verbal consent to communicate. The patient was seen in the ED on 12/30/2 for concerns of his wound. The patient has is concerned that he is allergic to the Iodosorb as it stings when he puts it on causes irritation. Moved patients appointment up for 1/14 to 1/7. He is also being see by Mark Loerdo MD on 1/16/25.   Anyi Severino RN on 1/2/2025 at 2:27 PM

## 2025-01-02 NOTE — TELEPHONE ENCOUNTER
Make sure when he comes in that he wears compression stocking so that we are able to make sure it has adequate compression.

## 2025-01-07 ENCOUNTER — OFFICE VISIT (OUTPATIENT)
Dept: INTERNAL MEDICINE | Facility: OTHER | Age: 20
End: 2025-01-07
Attending: NURSE PRACTITIONER
Payer: COMMERCIAL

## 2025-01-07 VITALS
WEIGHT: 315 LBS | TEMPERATURE: 97 F | RESPIRATION RATE: 16 BRPM | HEART RATE: 71 BPM | DIASTOLIC BLOOD PRESSURE: 92 MMHG | OXYGEN SATURATION: 97 % | SYSTOLIC BLOOD PRESSURE: 156 MMHG | BODY MASS INDEX: 66.23 KG/M2

## 2025-01-07 DIAGNOSIS — L97.912 NONHEALING ULCER OF RIGHT LOWER EXTREMITY WITH FAT LAYER EXPOSED (H): Primary | ICD-10-CM

## 2025-01-07 DIAGNOSIS — I82.501 CHRONIC DEEP VEIN THROMBOSIS (DVT) OF RIGHT LOWER EXTREMITY, UNSPECIFIED VEIN (H): ICD-10-CM

## 2025-01-07 DIAGNOSIS — T14.8XXA HEMATOMA OF SKIN: ICD-10-CM

## 2025-01-07 ASSESSMENT — ASTHMA QUESTIONNAIRES
QUESTION_3 LAST FOUR WEEKS HOW OFTEN DID YOUR ASTHMA SYMPTOMS (WHEEZING, COUGHING, SHORTNESS OF BREATH, CHEST TIGHTNESS OR PAIN) WAKE YOU UP AT NIGHT OR EARLIER THAN USUAL IN THE MORNING: NOT AT ALL
QUESTION_1 LAST FOUR WEEKS HOW MUCH OF THE TIME DID YOUR ASTHMA KEEP YOU FROM GETTING AS MUCH DONE AT WORK, SCHOOL OR AT HOME: NONE OF THE TIME
QUESTION_2 LAST FOUR WEEKS HOW OFTEN HAVE YOU HAD SHORTNESS OF BREATH: NOT AT ALL
QUESTION_5 LAST FOUR WEEKS HOW WOULD YOU RATE YOUR ASTHMA CONTROL: COMPLETELY CONTROLLED
ACT_TOTALSCORE: 25
QUESTION_4 LAST FOUR WEEKS HOW OFTEN HAVE YOU USED YOUR RESCUE INHALER OR NEBULIZER MEDICATION (SUCH AS ALBUTEROL): NOT AT ALL
ACT_TOTALSCORE: 25

## 2025-01-07 ASSESSMENT — PAIN SCALES - GENERAL: PAINLEVEL_OUTOF10: NO PAIN (1)

## 2025-01-07 NOTE — NURSING NOTE
"Chief Complaint   Patient presents with    WOUND CARE       FOOD SECURITY SCREENING QUESTIONS  Hunger Vital Signs:  Within the past 12 months we worried whether our food would run out before we got money to buy more. Never  Within the past 12 months the food we bought just didn't last and we didn't have money to get more. Never  Anyi Severino RN 1/7/2025 12:42 PM      Initial BP (!) 156/92 (BP Location: Right arm, Patient Position: Sitting, Cuff Size: Adult Large)   Pulse 71   Temp 97  F (36.1  C) (Tympanic)   Resp 16   Wt (!) 197.6 kg (435 lb 9.6 oz)   SpO2 97%   BMI 66.23 kg/m   Estimated body mass index is 66.23 kg/m  as calculated from the following:    Height as of 12/30/24: 1.727 m (5' 8\").    Weight as of this encounter: 197.6 kg (435 lb 9.6 oz).  Medication Reconciliation: complete    Anyi Severino RN    "

## 2025-01-07 NOTE — PROGRESS NOTES
[facility] PROGRESS NOTE    Assessment:     ICD-10-CM    1. Nonhealing ulcer of right lower extremity with fat layer exposed (H)  L97.912       2. Hematoma of skin  T14.8XXA       3. Chronic deep vein thrombosis (DVT) of right lower extremity, unspecified vein (H)  I82.501           Plan:   Finish course of antibiotic.  Reviewed and discussed with patient that at this time with the evaluation it appears as though ulceration likely developed from a hematoma under the skin.  Would not be surprised if it did not become larger as the old blood is clearing out.  He needs to continue to use compression therapy with thigh-high compression stocking.  Continue with same dressing change as previously prescribed.  Follow-up in 2 weeks.    Monitor closely for any s/sx of wound worsening or evidence of infection as reviewed and discussed at visit.  Follow up urgently with any worsening or infection concerns.    Subjective:  Patient seen today to follow up on wound at right lower leg.    Last week he was seen in ED.  There was redness and irritation around the wound.  He was started on an antibiotic.  He states that the appearance around the wound is improving with the antibiotic and it is not as tender.  Denies fever and chills.  With this first began he states it started as a pimple several months ago.  He cannot recall if he tried expressing anything out of the pimple or if there was any underlying trauma that occurred.  He is on anticoagulants daily.    Past Medical History:   Diagnosis Date    Acquired genu varum, unspecified laterality 3/22/2015    Activated protein C resistance (H)     Heterozygous for factor V Leiden mutation.  No primary prophylaxis recommended.    DVT, lower extremity, recurrent, right (H) 2/24/2021    Factor V Leiden (H) 8/30/2013    Overview:  Heterozygous for factor 5 deficiency.  Has seen Peds Heme/Onc at [facility] for workup. No h/o clot. Kate Maldonado MD ....................  12/5/2016   4:59 PM      Gastro-esophageal reflux disease without esophagitis     2015    Herpesviral infection     3/7/2011    Migraine without status migrainosus, not intractable     2012    Nasal congestion     Chronic nasal and sinus problems.    Osgood-Schlatter's disease of right lower extremity 10/22/2016    Osteochondroma of femur, left 3/22/2015    Other atopic dermatitis     10/24/2005    Pediatric body mass index (BMI) of greater than or equal to 95th percentile for age     4/15/2014    Severe obesity due to excess calories with serious comorbidity and body mass index (BMI) greater than 99th percentile for age in pediatric patient (H) 2020    Term birth of  male     Born 38 weeks induced vaginal delivery, birth wt 6 lbs 14 oz 19 in long.    Uncomplicated asthma     2010     Past Surgical History:   Procedure Laterality Date    ENDOSCOPIC SINUS SURGERY      ,X 3    OTHER SURGICAL HISTORY      14,[id number],OSTEOTOMY,Left, Dr. Gomes, for genu varum deformity    OTHER SURGICAL HISTORY      12/23/15,[id number],HARDWARE REMOVAL,Left,distal femur I plate removal for genu varum deformity    OTHER SURGICAL HISTORY      [id number],HARDWARE REMOVAL,bilateral prox tibial hardware repositioning and placement    TONSILLECTOMY, ADENOIDECTOMY, COMBINED      ,with tubes    TYMPANOSTOMY, LOCAL/TOPICAL ANESTHESIA      06,PE tubes     Amoxicillin, No clinical screening - see comments, and Penicillins  Current Outpatient Medications   Medication Sig Dispense Refill    albuterol (PROAIR HFA/PROVENTIL HFA/VENTOLIN HFA) 108 (90 Base) MCG/ACT inhaler Inhale 2 puffs into the lungs every 6 hours as needed for shortness of breath, wheezing or cough 18 g 0    cetirizine (ZYRTEC) 10 MG tablet Take 1 tablet (10 mg) by mouth every evening (Patient taking differently: Take 10 mg by mouth every evening. Taking as needed) 90 tablet 3    ELIQUIS ANTICOAGULANT 5 MG tablet TAKE 1 TABLET(5 MG) BY MOUTH TWICE DAILY 90 tablet 0     EPIPEN 2-BRADLEY 0.3 MG/0.3ML injection INJECT 0.3ML INTO THE MUSCLE ONCE AS NEEDED 2 each 5    sulfamethoxazole-trimethoprim (BACTRIM DS) 800-160 MG tablet Take 1 tablet by mouth 2 times daily. 20 tablet 0    terbinafine (LAMISIL) 1 % external cream Apply topically 2 times daily 42 g 3     No current facility-administered medications for this visit.       Review of Systems:  See HPI    Objective:   BP (!) 156/92 (BP Location: Right arm, Patient Position: Sitting, Cuff Size: Adult Large)   Pulse 71   Temp 97  F (36.1  C) (Tympanic)   Resp 16   Wt (!) 197.6 kg (435 lb 9.6 oz)   SpO2 97%   BMI 66.23 kg/m    Physical Exam     Pleasant gentleman no acute distress    Wound Type:  Nonhealing ulcer complicated by venous stasis and hematoma  Location:  Right lateral lower leg  Wound Measurements:  0.7 cm x 1 cm x 0.3 cm  Wound Base:  Dark red-brown wound base.  Undermining:  None  Tunneling:  None  Drainage:  Scant red-brown  Periwound Tissue:  Light purple surrounding wound opening and firmness beneath which would be consistent with hematoma  Debridement:  Autolytic  Treatment:  Cleansed with Anasept, Iodosorb applied.  Calcium alginate packed into wound followed by Medipore island dressing    ED notes reviewed and discussed    DONNA Zhao

## 2025-01-22 ENCOUNTER — OFFICE VISIT (OUTPATIENT)
Dept: INTERNAL MEDICINE | Facility: OTHER | Age: 20
End: 2025-01-22
Attending: NURSE PRACTITIONER
Payer: COMMERCIAL

## 2025-01-22 VITALS
HEART RATE: 80 BPM | TEMPERATURE: 98.4 F | RESPIRATION RATE: 12 BRPM | BODY MASS INDEX: 47.74 KG/M2 | HEIGHT: 68 IN | SYSTOLIC BLOOD PRESSURE: 139 MMHG | DIASTOLIC BLOOD PRESSURE: 83 MMHG | WEIGHT: 315 LBS | OXYGEN SATURATION: 97 %

## 2025-01-22 DIAGNOSIS — T14.8XXA HEMATOMA OF SKIN: ICD-10-CM

## 2025-01-22 DIAGNOSIS — I82.501 CHRONIC DEEP VEIN THROMBOSIS (DVT) OF RIGHT LOWER EXTREMITY, UNSPECIFIED VEIN (H): ICD-10-CM

## 2025-01-22 DIAGNOSIS — E66.01 MORBID OBESITY (H): ICD-10-CM

## 2025-01-22 DIAGNOSIS — L97.912 NONHEALING ULCER OF RIGHT LOWER EXTREMITY WITH FAT LAYER EXPOSED (H): Primary | ICD-10-CM

## 2025-01-22 ASSESSMENT — PAIN SCALES - GENERAL: PAINLEVEL_OUTOF10: NO PAIN (0)

## 2025-01-22 NOTE — NURSING NOTE
"Chief Complaint   Patient presents with    WOUND CARE     Right calf       Initial /83 (BP Location: Right arm, Patient Position: Sitting, Cuff Size: Adult Regular)   Pulse 80   Temp 98.4  F (36.9  C) (Temporal)   Resp 12   Ht 1.727 m (5' 8\")   Wt (!) 197.6 kg (435 lb 9.6 oz)   SpO2 97%   BMI 66.23 kg/m   Estimated body mass index is 66.23 kg/m  as calculated from the following:    Height as of this encounter: 1.727 m (5' 8\").    Weight as of this encounter: 197.6 kg (435 lb 9.6 oz).  Medication Review: complete    The next two questions are to help us understand your food security.  If you are feeling you need any assistance in this area, we have resources available to support you today.           No data to display                  Health Care Directive:  Patient does not have a Health Care Directive: Discussed advance care planning with patient; however, patient declined at this time.    Farzana Mccracken      "

## 2025-01-22 NOTE — PROGRESS NOTES
[facility] PROGRESS NOTE    Assessment:     ICD-10-CM    1. Nonhealing ulcer of right lower extremity with fat layer exposed (H)  L97.912 UNNA BOOT APPLICATION      2. Hematoma of skin  T14.8XXA UNNA BOOT APPLICATION      3. Chronic deep vein thrombosis (DVT) of right lower extremity, unspecified vein (H)  I82.501       4. Morbid obesity (H)  E66.01           Plan:   Completed to right lower leg to facilitate healing.  This will also help with compression and likely will be more consistent then him wearing the thigh-high compression stocking.  May remove Unna boot if he has pain or problems.  Follow-up in clinic in 1 week.    Monitor closely for any s/sx of wound worsening or evidence of infection as reviewed and discussed at visit.  Follow up urgently with any worsening or infection concerns.    Subjective:  Patient seen today to follow up on wound at right lower leg.    He believes it is getting a little better.  He is just ready for it to heal.  He states he has been wearing a thigh-high compression stocking on the right leg.    Past Medical History:   Diagnosis Date    Acquired genu varum, unspecified laterality 3/22/2015    Activated protein C resistance     Heterozygous for factor V Leiden mutation.  No primary prophylaxis recommended.    DVT, lower extremity, recurrent, right (H) 2/24/2021    Factor V Leiden 8/30/2013    Overview:  Heterozygous for factor 5 deficiency.  Has seen Peds Heme/Onc at [facility] for workup. No h/o clot. Kate Maldonado MD ....................  12/5/2016   4:59 PM     Gastro-esophageal reflux disease without esophagitis     12/14/2015    Herpesviral infection     3/7/2011    Migraine without status migrainosus, not intractable     2/8/2012    Nasal congestion     Chronic nasal and sinus problems.    Osgood-Schlatter's disease of right lower extremity 10/22/2016    Osteochondroma of femur, left 3/22/2015    Other atopic dermatitis     10/24/2005    Pediatric body mass index (BMI) of  greater than or equal to 95th percentile for age     4/15/2014    Severe obesity due to excess calories with serious comorbidity and body mass index (BMI) greater than 99th percentile for age in pediatric patient (H) 2020    Term birth of  male     Born 38 weeks induced vaginal delivery, birth wt 6 lbs 14 oz 19 in long.    Uncomplicated asthma     2010     Past Surgical History:   Procedure Laterality Date    ENDOSCOPIC SINUS SURGERY      ,X 3    OTHER SURGICAL HISTORY      14,[id number],OSTEOTOMY,Left, Dr. Gomes, for genu varum deformity    OTHER SURGICAL HISTORY      12/23/15,[id number],HARDWARE REMOVAL,Left,distal femur I plate removal for genu varum deformity    OTHER SURGICAL HISTORY      [id number],HARDWARE REMOVAL,bilateral prox tibial hardware repositioning and placement    TONSILLECTOMY, ADENOIDECTOMY, COMBINED      ,with tubes    TYMPANOSTOMY, LOCAL/TOPICAL ANESTHESIA      06,PE tubes     Amoxicillin, No clinical screening - see comments, and Penicillins  Current Outpatient Medications   Medication Sig Dispense Refill    albuterol (PROAIR HFA/PROVENTIL HFA/VENTOLIN HFA) 108 (90 Base) MCG/ACT inhaler Inhale 2 puffs into the lungs every 6 hours as needed for shortness of breath, wheezing or cough 18 g 0    cetirizine (ZYRTEC) 10 MG tablet Take 1 tablet (10 mg) by mouth every evening (Patient taking differently: Take 10 mg by mouth every evening. Taking as needed) 90 tablet 3    ELIQUIS ANTICOAGULANT 5 MG tablet TAKE 1 TABLET(5 MG) BY MOUTH TWICE DAILY 90 tablet 0    EPIPEN 2-BRADLEY 0.3 MG/0.3ML injection INJECT 0.3ML INTO THE MUSCLE ONCE AS NEEDED 2 each 5    sulfamethoxazole-trimethoprim (BACTRIM DS) 800-160 MG tablet Take 1 tablet by mouth 2 times daily. 20 tablet 0    terbinafine (LAMISIL) 1 % external cream Apply topically 2 times daily 42 g 3     No current facility-administered medications for this visit.       Review of Systems:  See HPI    Objective:   /83 (BP Location:  "Right arm, Patient Position: Sitting, Cuff Size: Adult Regular)   Pulse 80   Temp 98.4  F (36.9  C) (Temporal)   Resp 12   Ht 1.727 m (5' 8\")   Wt (!) 197.6 kg (435 lb 9.6 oz)   SpO2 97%   BMI 66.23 kg/m    Physical Exam     Pleasant gentleman no acute distress    Wound Type:  Nonhealing ulcer complicated by venous stasis and hematoma  Location:  Right lateral lower leg  Wound Measurements:  0.7 cm x 1.1 cm x 0. 4 cm  Wound Base:  Light pink-tan wound bed.  Undermining:  None  Tunneling:  None  Drainage:  Scant red-brown  Periwound Tissue:  No surrounding erythema, warmth or maceration   Debridement:  Autolytic  Treatment:  Cleansed with Anasept, Iodosorb applied.  Calcium alginate packed into wound followed by 4 inch OPTi foam.  Unna boot x 1, Kerlix x 1 and Coban x 1 applied to right lower extremity        DONNA Zhao   "

## 2025-01-29 ENCOUNTER — OFFICE VISIT (OUTPATIENT)
Dept: INTERNAL MEDICINE | Facility: OTHER | Age: 20
End: 2025-01-29
Attending: NURSE PRACTITIONER
Payer: COMMERCIAL

## 2025-01-29 VITALS
BODY MASS INDEX: 66.9 KG/M2 | SYSTOLIC BLOOD PRESSURE: 148 MMHG | WEIGHT: 315 LBS | HEART RATE: 97 BPM | RESPIRATION RATE: 17 BRPM | DIASTOLIC BLOOD PRESSURE: 80 MMHG | OXYGEN SATURATION: 97 % | TEMPERATURE: 97 F

## 2025-01-29 DIAGNOSIS — I82.501 CHRONIC DEEP VEIN THROMBOSIS (DVT) OF RIGHT LOWER EXTREMITY, UNSPECIFIED VEIN (H): ICD-10-CM

## 2025-01-29 DIAGNOSIS — T14.8XXA HEMATOMA OF SKIN: ICD-10-CM

## 2025-01-29 DIAGNOSIS — L97.912 NONHEALING ULCER OF RIGHT LOWER EXTREMITY WITH FAT LAYER EXPOSED (H): Primary | ICD-10-CM

## 2025-01-29 DIAGNOSIS — R60.1 GENERALIZED EDEMA: ICD-10-CM

## 2025-01-29 ASSESSMENT — PAIN SCALES - GENERAL: PAINLEVEL_OUTOF10: NO PAIN (0)

## 2025-01-29 NOTE — PROGRESS NOTES
[facility] PROGRESS NOTE    Assessment:     ICD-10-CM    1. Nonhealing ulcer of right lower extremity with fat layer exposed (H)  L97.912 Compression Sleeve/Stocking Order for DME - ONLY FOR DME      2. Hematoma of skin  T14.8XXA Compression Sleeve/Stocking Order for DME - ONLY FOR DME      3. Chronic deep vein thrombosis (DVT) of right lower extremity, unspecified vein (H)  I82.501 Compression Sleeve/Stocking Order for DME - ONLY FOR DME      4. Generalized edema  R60.1 Compression Sleeve/Stocking Order for DME - ONLY FOR DME          Plan:   Dressing change daily.  Discontinue Iodosorb.  Start Medihoney to wound bed, pack with AlgiSite followed by Medipore island dressing.  Discussed with patient that the only way for this ulceration to heal is for him to be compliant with compression.  Measured and order placed to [facility] for CircAid compression garments.  Once these arrive he will be instructed on proper donning with recommendations for 40-50 mmHg compression.  Monitor closely for any s/sx of wound worsening or evidence of infection as reviewed and discussed at visit.  Follow up urgently with any worsening or infection concerns.    Subjective:  Patient seen today to follow up on wound at right lower leg.    Had Unna boot placed last week.  He states that it hurt and he removed it after a couple of days.  He does not want an Unna boot.  He has not been wearing knee-high compression stockings.  Has not been elevating.  States that the wound has been stinging anything because it is because of the Iodosorb gel.  Has not been able to do foam dressing as he needs to shower every day and would not be covered by insurance to change dressing more frequently than every other day.    Past Medical History:   Diagnosis Date    Acquired genu varum, unspecified laterality 3/22/2015    Activated protein C resistance     Heterozygous for factor V Leiden mutation.  No primary prophylaxis recommended.    DVT, lower  extremity, recurrent, right (H) 2021    Factor V Leiden 2013    Overview:  Heterozygous for factor 5 deficiency.  Has seen Peds Heme/Onc at [facility] for workup. No h/o clot. Kate Maldonado MD ....................  2016   4:59 PM     Gastro-esophageal reflux disease without esophagitis     2015    Herpesviral infection     3/7/2011    Migraine without status migrainosus, not intractable     2012    Nasal congestion     Chronic nasal and sinus problems.    Osgood-Schlatter's disease of right lower extremity 10/22/2016    Osteochondroma of femur, left 3/22/2015    Other atopic dermatitis     10/24/2005    Pediatric body mass index (BMI) of greater than or equal to 95th percentile for age     4/15/2014    Severe obesity due to excess calories with serious comorbidity and body mass index (BMI) greater than 99th percentile for age in pediatric patient (H) 2020    Term birth of  male     Born 38 weeks induced vaginal delivery, birth wt 6 lbs 14 oz 19 in long.    Uncomplicated asthma     2010     Past Surgical History:   Procedure Laterality Date    ENDOSCOPIC SINUS SURGERY      2014,X 3    OTHER SURGICAL HISTORY      14,PARDQ545,OSTEOTOMY,Left, Dr. Gomes, for genu varum deformity    OTHER SURGICAL HISTORY      12/23/15,EOU220,HARDWARE REMOVAL,Left,distal femur I plate removal for genu varum deformity    OTHER SURGICAL HISTORY      KYR718,HARDWARE REMOVAL,bilateral prox tibial hardware repositioning and placement    TONSILLECTOMY, ADENOIDECTOMY, COMBINED      ,with tubes    TYMPANOSTOMY, LOCAL/TOPICAL ANESTHESIA      06,PE tubes     Amoxicillin, No clinical screening - see comments, and Penicillins  Current Outpatient Medications   Medication Sig Dispense Refill    albuterol (PROAIR HFA/PROVENTIL HFA/VENTOLIN HFA) 108 (90 Base) MCG/ACT inhaler Inhale 2 puffs into the lungs every 6 hours as needed for shortness of breath, wheezing or cough 18 g 0    cetirizine  (ZYRTEC) 10 MG tablet Take 1 tablet (10 mg) by mouth every evening (Patient taking differently: Take 10 mg by mouth every evening. Taking as needed) 90 tablet 3    ELIQUIS ANTICOAGULANT 5 MG tablet TAKE 1 TABLET(5 MG) BY MOUTH TWICE DAILY 90 tablet 0    EPIPEN 2-BRADLEY 0.3 MG/0.3ML injection INJECT 0.3ML INTO THE MUSCLE ONCE AS NEEDED 2 each 5    sulfamethoxazole-trimethoprim (BACTRIM DS) 800-160 MG tablet Take 1 tablet by mouth 2 times daily. 20 tablet 0    terbinafine (LAMISIL) 1 % external cream Apply topically 2 times daily 42 g 3     No current facility-administered medications for this visit.       Review of Systems:  See HPI    Objective:   BP (!) 148/80 (BP Location: Right arm, Patient Position: Sitting, Cuff Size: Adult Large)   Pulse 97   Temp 97  F (36.1  C) (Tympanic)   Resp 17   Wt (!) 199.6 kg (440 lb)   SpO2 97%   BMI 66.90 kg/m    Physical Exam     Pleasant gentleman no acute distress    Wound Type:  Nonhealing ulcer complicated by venous stasis  Location:  Right lateral lower leg  Wound Measurements:  0.7 cm x 1.2 cm x 0.5 cm  Wound Base:  Light pink-tan wound bed  Undermining:  None  Tunneling:  None  Drainage:  Small brown red, non-odorous  Periwound Tissue:  No surrounding erythema, warmth or maceration   Debridement:  Autolytic  Treatment:  Cleansed with Anasept, Medihoney applied,Calcium alginate packed into wound followed by medipore island dressing.  Unna boot x 1, Kerlix x 1 and Coban x 1 applied to right lower extremity        DONNA Zhao

## 2025-01-29 NOTE — NURSING NOTE
"Chief Complaint   Patient presents with    WOUND CARE       FOOD SECURITY SCREENING QUESTIONS  Hunger Vital Signs:  Within the past 12 months we worried whether our food would run out before we got money to buy more. Never  Within the past 12 months the food we bought just didn't last and we didn't have money to get more. Never  Anyi Severino RN 1/29/2025 1:39 PM      Initial BP (!) 148/80 (BP Location: Right arm, Patient Position: Sitting, Cuff Size: Adult Large)   Pulse 97   Temp 97  F (36.1  C) (Tympanic)   Resp 17   Wt (!) 199.6 kg (440 lb)   SpO2 97%   BMI 66.90 kg/m   Estimated body mass index is 66.9 kg/m  as calculated from the following:    Height as of 1/22/25: 1.727 m (5' 8\").    Weight as of this encounter: 199.6 kg (440 lb).  Medication Reconciliation: complete    Anyi Severino RN    "

## 2025-02-24 ENCOUNTER — OFFICE VISIT (OUTPATIENT)
Dept: INTERNAL MEDICINE | Facility: OTHER | Age: 20
End: 2025-02-24
Attending: NURSE PRACTITIONER
Payer: COMMERCIAL

## 2025-02-24 VITALS
TEMPERATURE: 98.6 F | HEART RATE: 75 BPM | OXYGEN SATURATION: 97 % | BODY MASS INDEX: 66.66 KG/M2 | SYSTOLIC BLOOD PRESSURE: 182 MMHG | WEIGHT: 315 LBS | DIASTOLIC BLOOD PRESSURE: 97 MMHG | RESPIRATION RATE: 16 BRPM

## 2025-02-24 DIAGNOSIS — R03.0 ELEVATED BLOOD PRESSURE READING WITHOUT DIAGNOSIS OF HYPERTENSION: ICD-10-CM

## 2025-02-24 DIAGNOSIS — T14.8XXA HEMATOMA OF SKIN: ICD-10-CM

## 2025-02-24 DIAGNOSIS — F41.9 ANXIETY: ICD-10-CM

## 2025-02-24 DIAGNOSIS — I10 WHITE COAT SYNDROME WITH DIAGNOSIS OF HYPERTENSION: ICD-10-CM

## 2025-02-24 DIAGNOSIS — R60.1 GENERALIZED EDEMA: ICD-10-CM

## 2025-02-24 DIAGNOSIS — D68.51 FACTOR V LEIDEN: ICD-10-CM

## 2025-02-24 DIAGNOSIS — D68.59 PROTEIN C DEFICIENCY: ICD-10-CM

## 2025-02-24 DIAGNOSIS — I82.501 CHRONIC DEEP VEIN THROMBOSIS (DVT) OF RIGHT LOWER EXTREMITY, UNSPECIFIED VEIN (H): ICD-10-CM

## 2025-02-24 DIAGNOSIS — L97.912 NONHEALING ULCER OF RIGHT LOWER EXTREMITY WITH FAT LAYER EXPOSED (H): Primary | ICD-10-CM

## 2025-02-24 ASSESSMENT — PAIN SCALES - GENERAL: PAINLEVEL_OUTOF10: NO PAIN (0)

## 2025-02-24 NOTE — PROGRESS NOTES
[facility] PROGRESS NOTE    Assessment:     ICD-10-CM    1. Nonhealing ulcer of right lower extremity with fat layer exposed (H)  L97.912       2. Hematoma of skin  T14.8XXA       3. Chronic deep vein thrombosis (DVT) of right lower extremity, unspecified vein (H)  I82.501       4. Generalized edema  R60.1       5. Elevated blood pressure reading without diagnosis of hypertension  R03.0       6. White coat syndrome with diagnosis of hypertension  I10       7. Anxiety  F41.9           Plan:   -Dressing change daily.  Apply Medihoney to wound bed, pack with AlgiSite followed by Medipore island dressing.  Continue with CircAid compression garments.    Follow-up in wound clinic in 2-3 weeks, sooner with concerns.  Monitor closely for any s/sx of wound worsening or evidence of infection as reviewed and discussed at visit.  Follow up urgently with any worsening or infection concerns.  -Reviewed and discussed patient's blood pressure reading with he and his significant other.  Explained that I was very concerned about his blood pressure being this high.  He assured me his blood pressure normally is not this high and that he just becomes stressed and anxious when he comes to the clinic.  Significant other who does have blood pressure cuff at home.  She will check his blood pressure at least daily and record.  If blood pressure reading continues to be this high then would recommend they follow-up in clinic within the next week.  Goal blood pressure of 135/85 recommended.  If higher then needs to be treated for hypertension.  Reviewed and discussed the complications which can occur with untreated hypertension.  He expresses understanding.    Subjective:  Patient seen today to follow up on wound at right lower leg.    Wearing CircAid compression garment on right lower leg.  He states this is very comfortable.  His significant other continues to do his dressing change and she believes wound is healing.  Blood pressure elevated in  clinic.  He states he gets very anxious when he comes to the clinic.  Other previous blood pressures intermittently elevated but some within normal range.    Past Medical History:   Diagnosis Date    Acquired genu varum, unspecified laterality 3/22/2015    Activated protein C resistance     Heterozygous for factor V Leiden mutation.  No primary prophylaxis recommended.    DVT, lower extremity, recurrent, right (H) 2021    Factor V Leiden 2013    Overview:  Heterozygous for factor 5 deficiency.  Has seen Peds Heme/Onc at [facility] for workup. No h/o clot. Kate Maldonado MD ....................  2016   4:59 PM     Gastro-esophageal reflux disease without esophagitis     2015    Herpesviral infection     3/7/2011    Migraine without status migrainosus, not intractable     2012    Nasal congestion     Chronic nasal and sinus problems.    Osgood-Schlatter's disease of right lower extremity 10/22/2016    Osteochondroma of femur, left 3/22/2015    Other atopic dermatitis     10/24/2005    Pediatric body mass index (BMI) of greater than or equal to 95th percentile for age     4/15/2014    Severe obesity due to excess calories with serious comorbidity and body mass index (BMI) greater than 99th percentile for age in pediatric patient (H) 2020    Term birth of  male     Born 38 weeks induced vaginal delivery, birth wt 6 lbs 14 oz 19 in long.    Uncomplicated asthma     2010     Past Surgical History:   Procedure Laterality Date    ENDOSCOPIC SINUS SURGERY      ,X 3    OTHER SURGICAL HISTORY      14,[id number],OSTEOTOMY,Left, Dr. Gomes, for genu varum deformity    OTHER SURGICAL HISTORY      12/23/15,[id number],HARDWARE REMOVAL,Left,distal femur I plate removal for genu varum deformity    OTHER SURGICAL HISTORY      [id number],HARDWARE REMOVAL,bilateral prox tibial hardware repositioning and placement    TONSILLECTOMY, ADENOIDECTOMY, COMBINED      ,with tubes    TYMPANOSTOMY,  LOCAL/TOPICAL ANESTHESIA      03/28/06,PE tubes     Amoxicillin, No clinical screening - see comments, and Penicillins  Current Outpatient Medications   Medication Sig Dispense Refill    albuterol (PROAIR HFA/PROVENTIL HFA/VENTOLIN HFA) 108 (90 Base) MCG/ACT inhaler Inhale 2 puffs into the lungs every 6 hours as needed for shortness of breath, wheezing or cough 18 g 0    cetirizine (ZYRTEC) 10 MG tablet Take 1 tablet (10 mg) by mouth every evening (Patient taking differently: Take 10 mg by mouth every evening. Taking as needed) 90 tablet 3    ELIQUIS ANTICOAGULANT 5 MG tablet TAKE 1 TABLET(5 MG) BY MOUTH TWICE DAILY 90 tablet 0    EPIPEN 2-BRADLEY 0.3 MG/0.3ML injection INJECT 0.3ML INTO THE MUSCLE ONCE AS NEEDED 2 each 5    sulfamethoxazole-trimethoprim (BACTRIM DS) 800-160 MG tablet Take 1 tablet by mouth 2 times daily. 20 tablet 0    terbinafine (LAMISIL) 1 % external cream Apply topically 2 times daily 42 g 3     No current facility-administered medications for this visit.       Review of Systems:  See HPI    Objective:   BP (!) 182/97 (BP Location: Right arm, Patient Position: Sitting, Cuff Size: Adult Regular)   Pulse 75   Temp 98.6  F (37  C) (Temporal)   Resp 16   Wt (!) 198.9 kg (438 lb 6.4 oz)   SpO2 97%   BMI 66.66 kg/m    Physical Exam     Pleasant gentleman no acute distress  Lung fields clear to auscultation.  Cardiovascular regular rate and rhythm.    Wound Type:  Nonhealing ulcer complicated by venous stasis  Location:  Right lateral lower leg  Wound Measurements:  0. 4 cm x 0.7 cm x 0. 3 cm  Wound Base:  100% light pink granulation tissue  Undermining:  None  Tunneling:  None  Drainage:  Small amount of serosanguineous drainage  Periwound Tissue:  No surrounding erythema, warmth or maceration   Debridement:  Autolytic  Treatment:  Cleansed with Anasept, Medihoney applied,Calcium alginate packed into wound followed by medipore island dressing.       Nishi Duncan, DONNA

## 2025-02-24 NOTE — NURSING NOTE
"Chief Complaint   Patient presents with    WOUND CARE       Initial BP (!) 172/91 (BP Location: Right arm, Patient Position: Sitting, Cuff Size: Adult Regular)   Pulse 75   Temp 98.6  F (37  C) (Temporal)   Resp 16   Wt (!) 198.9 kg (438 lb 6.4 oz)   SpO2 97%   BMI 66.66 kg/m   Estimated body mass index is 66.66 kg/m  as calculated from the following:    Height as of 1/22/25: 1.727 m (5' 8\").    Weight as of this encounter: 198.9 kg (438 lb 6.4 oz).  Medication Review: complete    The next two questions are to help us understand your food security.  If you are feeling you need any assistance in this area, we have resources available to support you today.           No data to display                  Health Care Directive:  Patient does not have a Health Care Directive: Discussed advance care planning with patient; however, patient declined at this time.    Diann Branham      "

## 2025-02-26 RX ORDER — APIXABAN 5 MG/1
5 TABLET, FILM COATED ORAL 2 TIMES DAILY
Qty: 90 TABLET | Refills: 0 | Status: SHIPPED | OUTPATIENT
Start: 2025-02-26

## 2025-02-27 NOTE — TELEPHONE ENCOUNTER
Will take several weeks to heal.  Is he wearing the compression hose and doing prescribed dressing change   Never

## 2025-03-09 ENCOUNTER — HEALTH MAINTENANCE LETTER (OUTPATIENT)
Age: 20
End: 2025-03-09

## 2025-04-23 ENCOUNTER — TELEPHONE (OUTPATIENT)
Dept: INTERNAL MEDICINE | Facility: OTHER | Age: 20
End: 2025-04-23
Payer: COMMERCIAL

## 2025-04-23 DIAGNOSIS — L97.912 NONHEALING ULCER OF RIGHT LOWER EXTREMITY WITH FAT LAYER EXPOSED (H): Primary | ICD-10-CM

## 2025-04-23 RX ORDER — ADHESIVE BANDAGE 2"X2.75"
BANDAGE TOPICAL
Qty: 1 EACH | Refills: 3 | OUTPATIENT
Start: 2025-04-23

## 2025-04-23 RX ORDER — CALCIUM ALGINATE 100 %
POWDER (GRAM) MISCELLANEOUS
Qty: 1 G | Refills: 3 | OUTPATIENT
Start: 2025-04-23

## 2025-04-23 NOTE — TELEPHONE ENCOUNTER
Insurance requires that patient is seen every month in order to refill wound care dressings.  Patient can be seen as a work in appointment on May 2.  In the meantime I have put some wound care supplies at the unit 3 window for patient to  which should last until appointment

## 2025-04-23 NOTE — TELEPHONE ENCOUNTER
After proper verification, patients Mother was notified that Provider would put wound care supplies up front of Unit 3 for patient to . They were also given a date and time for an appointment that Sowmya was able to work him in on 05/02/25 at 9:45 am. He was put into schedule.  Nydia Vela LPN on 4/23/2025 at 3:27 PM  EXT. [id number]

## 2025-04-23 NOTE — TELEPHONE ENCOUNTER
Reason for call: wound care supply refill    How many days of supplies do you have left? About a week    What pharmacy do you use? [facility] method for responding to this message: Telephone Call    Phone number patient can be reached at: Other phone number:  [phone]    If we cannot reach you directly, may we leave a detailed response at the number you provided? Yes     Please call John's wife at [phone] as John is working.  They need wound dressing, honey ointment, wound  and gauze pads.    Nina Singh on 4/23/2025 at 2:29 PM

## 2025-05-08 ENCOUNTER — OFFICE VISIT (OUTPATIENT)
Dept: FAMILY MEDICINE | Facility: OTHER | Age: 20
End: 2025-05-08
Payer: COMMERCIAL

## 2025-05-08 VITALS
HEIGHT: 68 IN | RESPIRATION RATE: 20 BRPM | OXYGEN SATURATION: 97 % | BODY MASS INDEX: 47.74 KG/M2 | DIASTOLIC BLOOD PRESSURE: 91 MMHG | TEMPERATURE: 97.5 F | SYSTOLIC BLOOD PRESSURE: 159 MMHG | WEIGHT: 315 LBS | HEART RATE: 102 BPM

## 2025-05-08 DIAGNOSIS — H66.91 ACUTE OTITIS MEDIA, RIGHT: Primary | ICD-10-CM

## 2025-05-08 PROCEDURE — 3077F SYST BP >= 140 MM HG: CPT

## 2025-05-08 PROCEDURE — 99213 OFFICE O/P EST LOW 20 MIN: CPT

## 2025-05-08 PROCEDURE — 1125F AMNT PAIN NOTED PAIN PRSNT: CPT

## 2025-05-08 PROCEDURE — 3080F DIAST BP >= 90 MM HG: CPT

## 2025-05-08 RX ORDER — CEFDINIR 300 MG/1
300 CAPSULE ORAL 2 TIMES DAILY
Qty: 14 CAPSULE | Refills: 0 | Status: SHIPPED | OUTPATIENT
Start: 2025-05-08 | End: 2025-05-15

## 2025-05-08 ASSESSMENT — PAIN SCALES - GENERAL: PAINLEVEL_OUTOF10: MODERATE PAIN (4)

## 2025-05-08 NOTE — NURSING NOTE
Pt here for right ear pain since Tuesday.  Got really bad last night.  Melina Naylor CMA ([facility])......................5/8/2025  2:06 PM     Medication Reconciliation: complete    Melina Naylor CMA  5/8/2025 2:06 PM      FOOD SECURITY SCREENING QUESTIONS:    The next two questions are to help us understand your food security.  If you are feeling you need any assistance in this area, we have resources available to support you today.    Hunger Vital Signs:  Within the past 12 months we worried whether our food would run out before we got money to buy more. Never  Within the past 12 months the food we bought just didn't last and we didn't have money to get more. Never  Melina Naylor CMA,LPN on 5/8/2025 at 2:06 PM

## 2025-05-08 NOTE — PROGRESS NOTES
John Yung  2005    ASSESSMENT/PLAN      1. Acute otitis media, right (Primary)  - cefdinir (OMNICEF) 300 MG capsule; Take 1 capsule (300 mg) by mouth 2 times daily for 7 days.  Dispense: 14 capsule; Refill: 0    - Cefdinir twice daily for 7 days provided for acute otitis media of the right ear  - Discussed using Flonase and Claritin daily.  - May use over-the-counter Tylenol or ibuprofen PRN  - Follow up as needed for new or worsening symptoms          *Explanation of diagnosis, treatment options and risk and benefits of medications reviewed with patient. Patient agrees with plan of care.  *All questions were answered.    *Red flags symptoms were discussed and patient was advised when they should return for reevaluation or for prompt emergency evaluation.   *Patient was given verbal and written instructions on plan of care. Instructions were printed or are available on [facility] on electronic AVS.   *We discussed potential side effects of any prescribed or recommended therapies, as well as expectations for response to treatments.  *Patient discharged in stable condition    Sohail Park, BERKLEY, APRN, FNP-C  [facility]    SUBJECTIVE  CHIEF COMPLAINT/ REASON FOR VISIT  Patient presents with:  Ear Problem: Right ear pain     HISTORY OF PRESENT ILLNESS  John Yung is a pleasant 20 year old male presents to [facility] today with right ear pain.  Patient states last few days he has had right-sided ear pain with pressure.  He does endorse reduced hearing.  He denies any fever, cough, recent illness, or seasonal allergies.  Patient has not used any over-the-counter medications.  He does state he is on Eliquis daily due to factor V Leiden.    History provided by patient      I have reviewed the nursing notes.  I have reviewed allergies, medication list, problem list, and past medical history.    REVIEW OF SYSTEMS  Review of Systems   Constitutional:  Negative for chills and fever.   HENT:   "Positive for ear pain. Negative for congestion, rhinorrhea and sore throat.    Respiratory:  Negative for cough, shortness of breath and wheezing.    Cardiovascular: Negative.    Gastrointestinal:  Negative for diarrhea, nausea and vomiting.   Skin: Negative.         VITAL SIGNS  Vitals:    05/08/25 1408   BP: (!) 159/91   BP Location: Right arm   Patient Position: Sitting   Cuff Size: Adult Regular   Pulse: 102   Resp: 20   Temp: 97.5  F (36.4  C)   TempSrc: Tympanic   SpO2: 97%   Weight: (!) 203.4 kg (448 lb 8 oz)   Height: 1.727 m (5' 8\")      Body mass index is 68.19 kg/m .      OBJECTIVE  PHYSICAL EXAM  Physical Exam  Vitals and nursing note reviewed.   Constitutional:       General: He is not in acute distress.     Appearance: Normal appearance. He is normal weight. He is not ill-appearing, toxic-appearing or diaphoretic.   HENT:      Head: Normocephalic and atraumatic.      Right Ear: Decreased hearing noted. Tenderness present. Tympanic membrane is erythematous and bulging. Tympanic membrane is not perforated.      Left Ear: Tympanic membrane, ear canal and external ear normal. There is no impacted cerumen. Tympanic membrane is not perforated, erythematous or bulging.      Nose: Nose normal. No congestion or rhinorrhea.      Mouth/Throat:      Mouth: Mucous membranes are moist.      Pharynx: Oropharynx is clear. No oropharyngeal exudate or posterior oropharyngeal erythema.   Eyes:      Extraocular Movements: Extraocular movements intact.      Conjunctiva/sclera: Conjunctivae normal.      Pupils: Pupils are equal, round, and reactive to light.   Cardiovascular:      Rate and Rhythm: Normal rate and regular rhythm.      Pulses: Normal pulses.      Heart sounds: Normal heart sounds.   Pulmonary:      Effort: Pulmonary effort is normal. No respiratory distress.      Breath sounds: Normal breath sounds. No stridor. No wheezing or rhonchi.   Musculoskeletal:      Cervical back: Normal range of motion. No rigidity " or tenderness.   Lymphadenopathy:      Cervical: No cervical adenopathy.   Neurological:      Mental Status: He is alert.            DIAGNOSTICS  No results found for any visits on 05/08/25.

## 2025-05-11 ASSESSMENT — ENCOUNTER SYMPTOMS
CARDIOVASCULAR NEGATIVE: 1
COUGH: 0
CHILLS: 0
WHEEZING: 0
SHORTNESS OF BREATH: 0
FEVER: 0
DIARRHEA: 0
VOMITING: 0
NAUSEA: 0
RHINORRHEA: 0
SORE THROAT: 0

## 2025-05-20 ENCOUNTER — TELEPHONE (OUTPATIENT)
Dept: INTERNAL MEDICINE | Facility: OTHER | Age: 20
End: 2025-05-20
Payer: COMMERCIAL

## 2025-05-20 NOTE — TELEPHONE ENCOUNTER
Dasha called in regards to Beau and needing more wound supplies. She stated that she is in need of the medical honey and wound wash spray. She is requesting a call back from Anyi. Please call to discuss and advise.  Betsy Urbano on 5/20/2025 at 8:58 AM

## 2025-05-20 NOTE — TELEPHONE ENCOUNTER
Called and spoke with the patient. I informed him I will place supplies at the unit 3 check in window. He would like to sign a consent to communicate for Dasha next time he is in.   Anyi Severino RN on 5/20/2025 at 2:58 PM

## 2025-06-02 ENCOUNTER — RESULTS FOLLOW-UP (OUTPATIENT)
Dept: INTERNAL MEDICINE | Facility: OTHER | Age: 20
End: 2025-06-02

## 2025-07-09 SDOH — HEALTH STABILITY: PHYSICAL HEALTH: ON AVERAGE, HOW MANY DAYS PER WEEK DO YOU ENGAGE IN MODERATE TO STRENUOUS EXERCISE (LIKE A BRISK WALK)?: 2 DAYS

## 2025-07-09 SDOH — HEALTH STABILITY: PHYSICAL HEALTH: ON AVERAGE, HOW MANY MINUTES DO YOU ENGAGE IN EXERCISE AT THIS LEVEL?: 60 MIN

## 2025-07-09 ASSESSMENT — ASTHMA QUESTIONNAIRES
QUESTION_4 LAST FOUR WEEKS HOW OFTEN HAVE YOU USED YOUR RESCUE INHALER OR NEBULIZER MEDICATION (SUCH AS ALBUTEROL): NOT AT ALL
QUESTION_3 LAST FOUR WEEKS HOW OFTEN DID YOUR ASTHMA SYMPTOMS (WHEEZING, COUGHING, SHORTNESS OF BREATH, CHEST TIGHTNESS OR PAIN) WAKE YOU UP AT NIGHT OR EARLIER THAN USUAL IN THE MORNING: NOT AT ALL
QUESTION_5 LAST FOUR WEEKS HOW WOULD YOU RATE YOUR ASTHMA CONTROL: COMPLETELY CONTROLLED
QUESTION_2 LAST FOUR WEEKS HOW OFTEN HAVE YOU HAD SHORTNESS OF BREATH: NOT AT ALL
ACT_TOTALSCORE: 25
QUESTION_1 LAST FOUR WEEKS HOW MUCH OF THE TIME DID YOUR ASTHMA KEEP YOU FROM GETTING AS MUCH DONE AT WORK, SCHOOL OR AT HOME: NONE OF THE TIME

## 2025-07-09 ASSESSMENT — SOCIAL DETERMINANTS OF HEALTH (SDOH): HOW OFTEN DO YOU GET TOGETHER WITH FRIENDS OR RELATIVES?: THREE TIMES A WEEK

## 2025-07-10 ENCOUNTER — OFFICE VISIT (OUTPATIENT)
Dept: FAMILY MEDICINE | Facility: OTHER | Age: 20
End: 2025-07-10
Attending: FAMILY MEDICINE
Payer: COMMERCIAL

## 2025-07-10 VITALS
BODY MASS INDEX: 66.32 KG/M2 | WEIGHT: 315 LBS | HEART RATE: 68 BPM | SYSTOLIC BLOOD PRESSURE: 132 MMHG | TEMPERATURE: 97.5 F | DIASTOLIC BLOOD PRESSURE: 76 MMHG | OXYGEN SATURATION: 97 % | RESPIRATION RATE: 16 BRPM

## 2025-07-10 DIAGNOSIS — J32.9 CHRONIC RECURRENT SINUSITIS: ICD-10-CM

## 2025-07-10 DIAGNOSIS — I10 ESSENTIAL HYPERTENSION: ICD-10-CM

## 2025-07-10 DIAGNOSIS — D68.51 FACTOR V LEIDEN: ICD-10-CM

## 2025-07-10 DIAGNOSIS — Z00.00 EXAMINATION, MEDICAL, GENERAL: Primary | ICD-10-CM

## 2025-07-10 DIAGNOSIS — J34.3 HYPERTROPHY OF NASAL TURBINATES: ICD-10-CM

## 2025-07-10 DIAGNOSIS — B00.9 HERPES SIMPLEX VIRUS (HSV) INFECTION: ICD-10-CM

## 2025-07-10 DIAGNOSIS — D68.59 PROTEIN C DEFICIENCY: ICD-10-CM

## 2025-07-10 RX ORDER — LISINOPRIL AND HYDROCHLOROTHIAZIDE 10; 12.5 MG/1; MG/1
1 TABLET ORAL DAILY
Qty: 90 TABLET | Refills: 3 | Status: SHIPPED | OUTPATIENT
Start: 2025-07-10 | End: 2026-07-05

## 2025-07-10 ASSESSMENT — ENCOUNTER SYMPTOMS
FEVER: 0
DYSURIA: 0
CARDIOVASCULAR NEGATIVE: 1
WOUND: 1
DYSPHORIC MOOD: 0
ARTHRALGIAS: 0
SORE THROAT: 0
PALPITATIONS: 0
CHILLS: 0
SHORTNESS OF BREATH: 0
HEMATURIA: 0
ABDOMINAL PAIN: 0
VOMITING: 0
RESPIRATORY NEGATIVE: 1
BACK PAIN: 0
NEUROLOGICAL NEGATIVE: 1
NERVOUS/ANXIOUS: 0
GASTROINTESTINAL NEGATIVE: 1
COLOR CHANGE: 0
EYE PAIN: 0
COUGH: 0
SEIZURES: 0
BRUISES/BLEEDS EASILY: 1
EYES NEGATIVE: 1
HALLUCINATIONS: 0
MUSCULOSKELETAL NEGATIVE: 1

## 2025-07-10 ASSESSMENT — PAIN SCALES - GENERAL: PAINLEVEL_OUTOF10: NO PAIN (0)

## 2025-07-10 NOTE — NURSING NOTE
"Chief Complaint   Patient presents with    Physical     Patient presents with girlfriend for annual physical.  Denies pain at this time.      Initial /76 (BP Location: Right arm, Patient Position: Sitting, Cuff Size: Adult Large)   Pulse 68   Temp 97.5  F (36.4  C) (Temporal)   Resp 16   Wt (!) 197.9 kg (436 lb 3.2 oz)   SpO2 97%   BMI 66.32 kg/m   Estimated body mass index is 66.32 kg/m  as calculated from the following:    Height as of 5/30/25: 1.727 m (5' 8\").    Weight as of this encounter: 197.9 kg (436 lb 3.2 oz).  Medication Review: complete    The next two questions are to help us understand your food security.  If you are feeling you need any assistance in this area, we have resources available to support you today.          7/9/2025   SDOH- Food Insecurity   Within the past 12 months, did you worry that your food would run out before you got money to buy more? N   Within the past 12 months, did the food you bought just not last and you didn t have money to get more? N         Health Care Directive:  Patient does not have a Health Care Directive: Discussed advance care planning with patient; however, patient declined at this time.    Adilia Schmidt LPN on 7/10/2025 at 10:02 AM        "

## 2025-07-10 NOTE — PROGRESS NOTES
"Preventive Care Visit  [facility] DO Danni, Family Medicine  Jul 10, 2025      Assessment & Plan     John was seen today for physical.    Diagnoses and all orders for this visit:    Examination, medical, general    Factor V Leiden  -     apixaban ANTICOAGULANT (ELIQUIS ANTICOAGULANT) 5 MG tablet; Take 1 tablet (5 mg) by mouth 2 times daily.    Protein C deficiency  -     apixaban ANTICOAGULANT (ELIQUIS ANTICOAGULANT) 5 MG tablet; Take 1 tablet (5 mg) by mouth 2 times daily.    Body mass index (BMI) pediatric, 95th percentile for age to less than 120% of the 95th percentile for age    Herpes simplex virus (HSV) infection    Chronic recurrent sinusitis    Hypertrophy of nasal turbinates    Essential hypertension  -     lisinopril-hydrochlorothiazide (ZESTORETIC) 10-12.5 MG tablet; Take 1 tablet by mouth daily.       I am going to stop the chlorthalidone as looking back he has stage II hypertension.  I would prefer to for him to be on an ACE inhibitor or an ARB coupled with a thiazide type diuretic.  I will change him to lisinopril with hydrochlorothiazide and recheck blood pressure and renal function along with potassium in 4 to 6 weeks    BMI  Estimated body mass index is 66.32 kg/m  as calculated from the following:    Height as of 5/30/25: 1.727 m (5' 8\").    Weight as of this encounter: 197.9 kg (436 lb 3.2 oz).       Counseling  Appropriate preventive services were addressed with this patient via screening, questionnaire, or discussion as appropriate for fall prevention, nutrition, physical activity, Tobacco-use cessation, social engagement, weight loss and cognition.  Checklist reviewing preventive services available has been given to the patient.  Reviewed patient's diet, addressing concerns and/or questions.   He is at risk for lack of exercise and has been provided with information to increase physical activity for the benefit of his well-being.           Subjective   Beau " is a 20 year old, presenting for the following:  Physical        7/10/2025     9:54 AM   Additional Questions   Roomed by Adilia Schmidt LPN   Accompanied by Dasha - girlfriend          HPI  Patient presents for annual exam.  He was just started on chlorthalidone for elevated blood pressure when he was seen by another provider who is taking care of a wound on his right lower leg.  Other than this he has no complaints.         Advance Care Planning    Discussed advance care planning with patient; however, patient declined at this time.        7/9/2025   General Health   How would you rate your overall physical health? (!) FAIR   Feel stress (tense, anxious, or unable to sleep) Only a little   (!) STRESS CONCERN      7/9/2025   Nutrition   Three or more servings of calcium each day? Yes   Diet: Regular (no restrictions)   How many servings of fruit and vegetables per day? (!) 0-1   How many sweetened beverages each day? 0-1         7/9/2025   Exercise   Days per week of moderate/strenous exercise 2 days   Average minutes spent exercising at this level 60 min   (!) EXERCISE CONCERN      7/9/2025   Social Factors   Frequency of gathering with friends or relatives Three times a week   Worry food won't last until get money to buy more No   Food not last or not have enough money for food? No   Do you have housing? (Housing is defined as stable permanent housing and does not include staying outside in a car, in a tent, in an abandoned building, in an overnight shelter, or couch-surfing.) Yes   Are you worried about losing your housing? No   Lack of transportation? No   Unable to get utilities (heat,electricity)? No         7/9/2025   Dental   Dentist two times every year? Yes           Today's PHQ-2 Score:       5/8/2025     2:06 PM   PHQ-2 ( 1999 [employer])   Q1: Little interest or pleasure in doing things 0    Q2: Feeling down, depressed or hopeless 0    PHQ-2 Score 0    Q1: Little interest or pleasure in doing things Not  at all   Q2: Feeling down, depressed or hopeless Not at all   PHQ-2 Score 0       Proxy-reported         7/9/2025   Substance Use   Alcohol more than 3/day or more than 7/wk No   Do you use any other substances recreationally? No     Social History     Tobacco Use    Smoking status: Never     Passive exposure: Never    Smokeless tobacco: Never   Vaping Use    Vaping status: Never Used   Substance Use Topics    Alcohol use: Yes     Alcohol/week: 1.0 standard drink of alcohol     Types: 1 Cans of beer per week    Drug use: Never             7/9/2025   One time HIV Screening   Previous HIV test? I don't know         7/9/2025   STI Screening   New sexual partner(s) since last STI/HIV test? 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  No primary prophylaxis recommended.    DVT, lower extremity, recurrent, right (H) 2/24/2021    Factor V Leiden 8/30/2013    Overview:  Heterozygous for factor 5 deficiency.  Has seen Peds Heme/Onc at [facility] for workup. No h/o clot. Kate Maldonado MD ....................  12/5/2016   4:59 PM     Gastro-esophageal reflux disease without esophagitis     12/14/2015    Herpesviral infection     3/7/2011    Migraine without status migrainosus, not intractable     2/8/2012    Nasal congestion     Chronic nasal and sinus problems.    Osgood-Schlatter's disease of right lower extremity 10/22/2016    Osteochondroma of femur, left 3/22/2015    Other atopic dermatitis     10/24/2005    Pediatric body mass index (BMI) of greater than or equal to 95th percentile for age     4/15/2014    Severe obesity due to excess calories  with serious comorbidity and body mass index (BMI) greater than 99th percentile for age in pediatric patient (H) 2020    Term birth of  male     Born 38 weeks induced vaginal delivery, birth wt 6 lbs 14 oz 19 in long.    Uncomplicated asthma     2010     Past Surgical History:   Procedure Laterality Date    ENDOSCOPIC SINUS SURGERY      ,X 3    OTHER SURGICAL HISTORY      14,[id number],OSTEOTOMY,Left, Dr. Gomes, for genu varum deformity    OTHER SURGICAL HISTORY      12/23/15,[id number],HARDWARE REMOVAL,Left,distal femur I plate removal for genu varum deformity    OTHER SURGICAL HISTORY      [id number],HARDWARE REMOVAL,bilateral prox tibial hardware repositioning and placement    TONSILLECTOMY, ADENOIDECTOMY, COMBINED      ,with tubes    TYMPANOSTOMY, LOCAL/TOPICAL ANESTHESIA      06,PE tubes     Current Outpatient Medications   Medication Sig Dispense Refill    apixaban ANTICOAGULANT (ELIQUIS ANTICOAGULANT) 5 MG tablet Take 1 tablet (5 mg) by mouth 2 times daily. 90 tablet 3    cetirizine (ZYRTEC) 10 MG tablet Take 1 tablet (10 mg) by mouth every evening 90 tablet 3    EPIPEN 2-BRADLEY 0.3 MG/0.3ML injection INJECT 0.3ML INTO THE MUSCLE ONCE AS NEEDED 2 each 5    lisinopril-hydrochlorothiazide (ZESTORETIC) 10-12.5 MG tablet Take 1 tablet by mouth daily. 90 tablet 3     Allergies   Allergen Reactions    Amoxicillin GI Disturbance and Diarrhea     Per mom states he gets severe diarrhea from it.     No Clinical Screening - See Comments      Mold, pollen, cats    Penicillins Diarrhea     Recent Labs   Lab Test 25  0850 23  2115 20  1839 19  1619   A1C  --   --   --  5.0   *  --   --  78   HDL 38*  --   --  31   TRIG 113  --   --  257*   CR 0.94 1.07 0.96  --    GFRESTIMATED >90 >90 >90  --    GFRESTBLACK  --   --  >90  --    POTASSIUM 4.0 3.7 4.1  --         Review of Systems   Constitutional:  Negative for chills and fever.   HENT: Negative.  Negative for ear pain  "and sore throat.    Eyes: Negative.  Negative for pain and visual disturbance.   Respiratory: Negative.  Negative for cough and shortness of breath.    Cardiovascular: Negative.  Negative for chest pain and palpitations.   Gastrointestinal: Negative.  Negative for abdominal pain and vomiting.   Genitourinary: Negative.  Negative for dysuria, hematuria and penile pain.   Musculoskeletal: Negative.  Negative for arthralgias and back pain.   Skin:  Positive for wound. Negative for color change and rash.   Neurological: Negative.  Negative for seizures and syncope.   Hematological:  Bruises/bleeds easily.   Psychiatric/Behavioral:  Negative for dysphoric mood, hallucinations and suicidal ideas. The patient is not nervous/anxious.    All other systems reviewed and are negative.        Objective    Exam  /76 (BP Location: Right arm, Patient Position: Sitting, Cuff Size: Adult Large)   Pulse 68   Temp 97.5  F (36.4  C) (Temporal)   Resp 16   Wt (!) 197.9 kg (436 lb 3.2 oz)   SpO2 97%   BMI 66.32 kg/m     Estimated body mass index is 66.32 kg/m  as calculated from the following:    Height as of 5/30/25: 1.727 m (5' 8\").    Weight as of this encounter: 197.9 kg (436 lb 3.2 oz).    Physical Exam  Constitutional:       Appearance: Normal appearance.   HENT:      Head: Normocephalic.      Right Ear: Tympanic membrane and ear canal normal.      Left Ear: Tympanic membrane and ear canal normal.      Nose: Nose normal.      Mouth/Throat:      Mouth: Mucous membranes are moist.      Pharynx: Oropharynx is clear.   Eyes:      Conjunctiva/sclera: Conjunctivae normal.      Pupils: Pupils are equal, round, and reactive to light.   Cardiovascular:      Rate and Rhythm: Normal rate and regular rhythm.      Heart sounds: Normal heart sounds. No murmur heard.  Pulmonary:      Effort: Pulmonary effort is normal.      Breath sounds: Normal breath sounds.   Abdominal:      General: Abdomen is flat. Bowel sounds are normal. There " is no distension.      Palpations: Abdomen is soft.      Tenderness: There is no abdominal tenderness.   Musculoskeletal:         General: Normal range of motion.      Cervical back: Neck supple.   Skin:     General: Skin is warm and dry.      Capillary Refill: Capillary refill takes 2 to 3 seconds.      Comments: Right lower leg wound was not unbandaged or evaluated at this visit.   Neurological:      General: No focal deficit present.      Mental Status: He is alert and oriented to person, place, and time. Mental status is at baseline.   Psychiatric:         Mood and Affect: Mood normal.         Behavior: Behavior normal.               Signed Electronically by: Saul Razo DO

## 2025-07-21 ENCOUNTER — OFFICE VISIT (OUTPATIENT)
Dept: INTERNAL MEDICINE | Facility: OTHER | Age: 20
End: 2025-07-21
Attending: NURSE PRACTITIONER
Payer: COMMERCIAL

## 2025-07-21 VITALS
WEIGHT: 315 LBS | TEMPERATURE: 97 F | SYSTOLIC BLOOD PRESSURE: 138 MMHG | OXYGEN SATURATION: 98 % | RESPIRATION RATE: 16 BRPM | DIASTOLIC BLOOD PRESSURE: 87 MMHG | BODY MASS INDEX: 67.3 KG/M2

## 2025-07-21 DIAGNOSIS — L97.912 NONHEALING ULCER OF RIGHT LOWER EXTREMITY WITH FAT LAYER EXPOSED (H): Primary | ICD-10-CM

## 2025-07-21 DIAGNOSIS — I82.501 CHRONIC DEEP VEIN THROMBOSIS (DVT) OF RIGHT LOWER EXTREMITY, UNSPECIFIED VEIN (H): ICD-10-CM

## 2025-07-21 DIAGNOSIS — D68.51 FACTOR V LEIDEN: ICD-10-CM

## 2025-07-21 DIAGNOSIS — D68.59 PROTEIN C DEFICIENCY: ICD-10-CM

## 2025-07-21 DIAGNOSIS — E66.01 MORBID OBESITY (H): ICD-10-CM

## 2025-07-21 ASSESSMENT — PAIN SCALES - GENERAL: PAINLEVEL_OUTOF10: NO PAIN (0)

## 2025-07-21 NOTE — NURSING NOTE
"Chief Complaint   Patient presents with    WOUND CARE       FOOD SECURITY SCREENING QUESTIONS  Hunger Vital Signs:  Within the past 12 months we worried whether our food would run out before we got money to buy more. Never  Within the past 12 months the food we bought just didn't last and we didn't have money to get more. Never  Anyi Severino RN 7/21/2025 1:03 PM      Initial BP (!) 186/112 (BP Location: Right arm, Patient Position: Sitting, Cuff Size: Adult Large)   Pulse 98   Temp 97  F (36.1  C) (Tympanic)   Resp 16   Wt (!) 200.8 kg (442 lb 9.6 oz)   SpO2 98%   BMI 67.30 kg/m   Estimated body mass index is 67.3 kg/m  as calculated from the following:    Height as of 5/30/25: 1.727 m (5' 8\").    Weight as of this encounter: 200.8 kg (442 lb 9.6 oz).  Medication Reconciliation: complete    Anyi Severino RN    "

## 2025-07-21 NOTE — PROGRESS NOTES
[facility] PROGRESS NOTE    Assessment:     ICD-10-CM    1. Nonhealing ulcer of right lower extremity with fat layer exposed (H)  L97.912 Vascular Medicine Referral      2. Chronic deep vein thrombosis (DVT) of right lower extremity, unspecified vein (H)  I82.501 Vascular Medicine Referral      3. Factor V Leiden  D68.51 Vascular Medicine Referral      4. Protein C deficiency  D68.59 Vascular Medicine Referral      5. Morbid obesity (H)  E66.01 Vascular Medicine Referral          Plan:   -Non-compliance with daily compression with circ-aid.  Unfortunately, I have no other wound care suggestions to assist with wound healing.  Without appropriate venous stasis management he is not able to heal.  I will refer him to vein specialist in vascular medicine to evaluate further and make recommendations.  -Dressing change every other day.  Cleanse with MicroKlenz spray.  Apply Medihoney to wound.  Cut collagen to fit size of wound and placed into wound.  Cut AlgiSite to fit size of wound and place into wound after collagen.  Cover with 3 inch Allevyn gentle border light dressing.  - Hypertension: Will recheck blood pressure today.  He will keep follow-up appointment with PCP.  - Continue Eliquis and continue following with PCP.    Subjective:  Patient seen today to follow up on wound at right lower leg and hypertension.  He is fiancé does dressing change every other day.  He only wear CircAid garment when he is on him in the evenings.  He does not wear it when he is at work during the day because he states it is too hot.  He is not finding that the wound is healing.  He was seen by PCP recently for hypertension.  He was switched to lisinopril HCTZ and chlorthalidone was discontinued.  He has not blood pressure since that time.  He has history of chronic DVT, on chronic anticoagulation therapy in addition to protein C and factor V Leiden.    Past Medical History:   Diagnosis Date    Acquired genu varum, unspecified laterality  3/22/2015    Activated protein C resistance     Heterozygous for factor V Leiden mutation.  No primary prophylaxis recommended.    DVT, lower extremity, recurrent, right (H) 2021    Factor V Leiden 2013    Overview:  Heterozygous for factor 5 deficiency.  Has seen Peds Heme[facility]On[facility] at [facility] for workup. No h/o clot. Kate Maldonado MD ....................  2016   4:59 PM     Gastro-esophageal reflux disease without esophagitis     2015    Herpesviral infection     3/7/2011    Migraine without status migrainosus, not intractable     2012    Nasal congestion     Chronic nasal and sinus problems.    Osgood-Schlatter's disease of right lower extremity 10/22/2016    Osteochondroma of femur, left 3/22/2015    Other atopic dermatitis     10/24/2005    Pediatric body mass index (BMI) of greater than or equal to 95th percentile for age     4/15/2014    Severe obesity due to excess calories with serious comorbidity and body mass index (BMI) greater than 99th percentile for age in pediatric patient (H) 2020    Term birth of  male     Born 38 weeks induced vaginal delivery, birth wt 6 lbs 14 oz 19 in long.    Uncomplicated asthma     2010     Past Surgical History:   Procedure Laterality Date    ENDOSCOPIC SINUS SURGERY      ,X 3    OTHER SURGICAL HISTORY      14,[id number],OSTEOTOMY,Left, Dr. Gomes, for genu varum deformity    OTHER SURGICAL HISTORY      12/23/15,[id number],HARDWARE REMOVAL,Left,distal femur I plate removal for genu varum deformity    OTHER SURGICAL HISTORY      UXH540,HARDWARE REMOVAL,bilateral prox tibial hardware repositioning and placement    TONSILLECTOMY, ADENOIDECTOMY, COMBINED      ,with tubes    TYMPANOSTOMY, LOCAL/TOPICAL ANESTHESIA      06,PE tubes     Amoxicillin, No clinical screening - see comments, and Penicillins  Current Outpatient Medications   Medication Sig Dispense Refill    apixaban ANTICOAGULANT (ELIQUIS ANTICOAGULANT) 5 MG tablet  Take 1 tablet (5 mg) by mouth 2 times daily. 90 tablet 3    EPIPEN 2-BRADLEY 0.3 MG/0.3ML injection INJECT 0.3ML INTO THE MUSCLE ONCE AS NEEDED 2 each 5    lisinopril-hydrochlorothiazide (ZESTORETIC) 10-12.5 MG tablet Take 1 tablet by mouth daily. 90 tablet 3     No current facility-administered medications for this visit.       Review of Systems:  See HPI    Objective:   /87   Temp 97  F (36.1  C) (Tympanic)   Resp 16   Wt (!) 200.8 kg (442 lb 9.6 oz)   SpO2 98%   BMI 67.30 kg/m    Physical Exam     Pleasant gentleman no acute distress.  Right lower extremity with 2+ edema.      Wound Type:  Nonhealing ulcer complicated by venous stasis  Location:  Right lateral lower leg  Wound Measurements:  0.9 cm x 0.7 cm x 0.2 cm  Wound Base:  100% dark pink granulation tissue  Undermining:  None  Tunneling:  None  Drainage:  Moderate serosanguineous drainage  Periwound Tissue:  No surrounding erythema, warmth or maceration   Debridement:  Autolytic  Treatment:  Cleansed with Anasept, Medihoney applied, collagen cut to size and placed into wound, calcium alginate packed into wound followed by 3 inch Allevyn gentle border light dressing.      DONNA Zhao

## 2025-08-21 ENCOUNTER — OFFICE VISIT (OUTPATIENT)
Dept: FAMILY MEDICINE | Facility: OTHER | Age: 20
End: 2025-08-21
Attending: FAMILY MEDICINE
Payer: COMMERCIAL

## 2025-08-21 VITALS
RESPIRATION RATE: 16 BRPM | OXYGEN SATURATION: 97 % | TEMPERATURE: 97.6 F | BODY MASS INDEX: 66.38 KG/M2 | HEART RATE: 93 BPM | WEIGHT: 315 LBS | SYSTOLIC BLOOD PRESSURE: 136 MMHG | DIASTOLIC BLOOD PRESSURE: 94 MMHG

## 2025-08-21 DIAGNOSIS — I10 ESSENTIAL HYPERTENSION: ICD-10-CM

## 2025-08-21 RX ORDER — LISINOPRIL AND HYDROCHLOROTHIAZIDE 20; 25 MG/1; MG/1
1 TABLET ORAL DAILY
Qty: 90 TABLET | Refills: 3 | Status: SHIPPED | OUTPATIENT
Start: 2025-08-21 | End: 2026-08-16

## 2025-08-21 ASSESSMENT — ENCOUNTER SYMPTOMS
EYE PAIN: 0
ARTHRALGIAS: 0
SHORTNESS OF BREATH: 0
SORE THROAT: 0
PALPITATIONS: 0
ABDOMINAL PAIN: 0
BACK PAIN: 0
DYSURIA: 0
COUGH: 0
VOMITING: 0
FEVER: 0
COLOR CHANGE: 0
HEMATURIA: 0
SEIZURES: 0
CHILLS: 0

## 2025-08-21 ASSESSMENT — PAIN SCALES - GENERAL: PAINLEVEL_OUTOF10: NO PAIN (0)

## 2025-08-26 ENCOUNTER — OFFICE VISIT (OUTPATIENT)
Dept: INTERNAL MEDICINE | Facility: OTHER | Age: 20
End: 2025-08-26
Attending: NURSE PRACTITIONER
Payer: COMMERCIAL

## 2025-08-26 VITALS
WEIGHT: 315 LBS | DIASTOLIC BLOOD PRESSURE: 81 MMHG | OXYGEN SATURATION: 97 % | SYSTOLIC BLOOD PRESSURE: 151 MMHG | BODY MASS INDEX: 66.45 KG/M2 | HEART RATE: 83 BPM | RESPIRATION RATE: 16 BRPM | TEMPERATURE: 98.4 F

## 2025-08-26 DIAGNOSIS — I82.501 CHRONIC DEEP VEIN THROMBOSIS (DVT) OF RIGHT LOWER EXTREMITY, UNSPECIFIED VEIN (H): ICD-10-CM

## 2025-08-26 DIAGNOSIS — L97.912 NONHEALING ULCER OF RIGHT LOWER EXTREMITY WITH FAT LAYER EXPOSED (H): Primary | ICD-10-CM

## 2025-08-26 ASSESSMENT — PAIN SCALES - GENERAL: PAINLEVEL_OUTOF10: NO PAIN (0)

## (undated) RX ORDER — ACETAMINOPHEN 500 MG
TABLET ORAL
Status: DISPENSED
Start: 2021-07-22

## (undated) RX ORDER — KETOROLAC TROMETHAMINE 30 MG/ML
INJECTION, SOLUTION INTRAMUSCULAR; INTRAVENOUS
Status: DISPENSED
Start: 2019-02-27

## (undated) RX ORDER — LIDOCAINE HYDROCHLORIDE 10 MG/ML
INJECTION, SOLUTION EPIDURAL; INFILTRATION; INTRACAUDAL; PERINEURAL
Status: DISPENSED
Start: 2020-03-25

## (undated) RX ORDER — ACETAMINOPHEN 325 MG/1
TABLET ORAL
Status: DISPENSED
Start: 2023-05-18

## (undated) RX ORDER — SULFAMETHOXAZOLE AND TRIMETHOPRIM 800; 160 MG/1; MG/1
TABLET ORAL
Status: DISPENSED
Start: 2024-12-31